# Patient Record
Sex: MALE | Race: ASIAN | NOT HISPANIC OR LATINO | URBAN - METROPOLITAN AREA
[De-identification: names, ages, dates, MRNs, and addresses within clinical notes are randomized per-mention and may not be internally consistent; named-entity substitution may affect disease eponyms.]

---

## 2021-11-28 ENCOUNTER — INPATIENT (INPATIENT)
Facility: HOSPITAL | Age: 66
LOS: 4 days | Discharge: HOME | End: 2021-12-03
Attending: HOSPITALIST | Admitting: HOSPITALIST
Payer: MEDICAID

## 2021-11-28 VITALS
SYSTOLIC BLOOD PRESSURE: 133 MMHG | DIASTOLIC BLOOD PRESSURE: 83 MMHG | HEART RATE: 100 BPM | OXYGEN SATURATION: 98 % | TEMPERATURE: 98 F | RESPIRATION RATE: 15 BRPM

## 2021-11-28 DIAGNOSIS — E11.9 TYPE 2 DIABETES MELLITUS WITHOUT COMPLICATIONS: ICD-10-CM

## 2021-11-28 DIAGNOSIS — Z29.9 ENCOUNTER FOR PROPHYLACTIC MEASURES, UNSPECIFIED: ICD-10-CM

## 2021-11-28 DIAGNOSIS — K82.8 OTHER SPECIFIED DISEASES OF GALLBLADDER: ICD-10-CM

## 2021-11-28 DIAGNOSIS — I10 ESSENTIAL (PRIMARY) HYPERTENSION: ICD-10-CM

## 2021-11-28 LAB
A1C WITH ESTIMATED AVERAGE GLUCOSE RESULT: 7 % — HIGH (ref 4–5.6)
ALBUMIN SERPL ELPH-MCNC: 3.7 G/DL — SIGNIFICANT CHANGE UP (ref 3.3–5)
ALP SERPL-CCNC: 60 U/L — SIGNIFICANT CHANGE UP (ref 40–120)
ALT FLD-CCNC: 39 U/L — SIGNIFICANT CHANGE UP (ref 4–41)
ANION GAP SERPL CALC-SCNC: 10 MMOL/L — SIGNIFICANT CHANGE UP (ref 7–14)
AST SERPL-CCNC: 20 U/L — SIGNIFICANT CHANGE UP (ref 4–40)
BASOPHILS # BLD AUTO: 0.04 K/UL — SIGNIFICANT CHANGE UP (ref 0–0.2)
BASOPHILS NFR BLD AUTO: 0.4 % — SIGNIFICANT CHANGE UP (ref 0–2)
BILIRUB SERPL-MCNC: 0.2 MG/DL — SIGNIFICANT CHANGE UP (ref 0.2–1.2)
BUN SERPL-MCNC: 15 MG/DL — SIGNIFICANT CHANGE UP (ref 7–23)
CALCIUM SERPL-MCNC: 9.3 MG/DL — SIGNIFICANT CHANGE UP (ref 8.4–10.5)
CHLORIDE SERPL-SCNC: 103 MMOL/L — SIGNIFICANT CHANGE UP (ref 98–107)
CO2 SERPL-SCNC: 24 MMOL/L — SIGNIFICANT CHANGE UP (ref 22–31)
CREAT SERPL-MCNC: 1.01 MG/DL — SIGNIFICANT CHANGE UP (ref 0.5–1.3)
EOSINOPHIL # BLD AUTO: 0.23 K/UL — SIGNIFICANT CHANGE UP (ref 0–0.5)
EOSINOPHIL NFR BLD AUTO: 2.5 % — SIGNIFICANT CHANGE UP (ref 0–6)
ESTIMATED AVERAGE GLUCOSE: 154 — SIGNIFICANT CHANGE UP
GLUCOSE BLDC GLUCOMTR-MCNC: 95 MG/DL — SIGNIFICANT CHANGE UP (ref 70–99)
GLUCOSE SERPL-MCNC: 108 MG/DL — HIGH (ref 70–99)
HCT VFR BLD CALC: 41.4 % — SIGNIFICANT CHANGE UP (ref 39–50)
HGB BLD-MCNC: 13.3 G/DL — SIGNIFICANT CHANGE UP (ref 13–17)
IANC: 5.07 K/UL — SIGNIFICANT CHANGE UP (ref 1.5–8.5)
IMM GRANULOCYTES NFR BLD AUTO: 0.3 % — SIGNIFICANT CHANGE UP (ref 0–1.5)
LIDOCAIN IGE QN: 43 U/L — SIGNIFICANT CHANGE UP (ref 7–60)
LYMPHOCYTES # BLD AUTO: 2.88 K/UL — SIGNIFICANT CHANGE UP (ref 1–3.3)
LYMPHOCYTES # BLD AUTO: 31.6 % — SIGNIFICANT CHANGE UP (ref 13–44)
MCHC RBC-ENTMCNC: 30.3 PG — SIGNIFICANT CHANGE UP (ref 27–34)
MCHC RBC-ENTMCNC: 32.1 GM/DL — SIGNIFICANT CHANGE UP (ref 32–36)
MCV RBC AUTO: 94.3 FL — SIGNIFICANT CHANGE UP (ref 80–100)
MONOCYTES # BLD AUTO: 0.87 K/UL — SIGNIFICANT CHANGE UP (ref 0–0.9)
MONOCYTES NFR BLD AUTO: 9.5 % — SIGNIFICANT CHANGE UP (ref 2–14)
NEUTROPHILS # BLD AUTO: 5.07 K/UL — SIGNIFICANT CHANGE UP (ref 1.8–7.4)
NEUTROPHILS NFR BLD AUTO: 55.7 % — SIGNIFICANT CHANGE UP (ref 43–77)
NRBC # BLD: 0 /100 WBCS — SIGNIFICANT CHANGE UP
NRBC # FLD: 0 K/UL — SIGNIFICANT CHANGE UP
PLATELET # BLD AUTO: 263 K/UL — SIGNIFICANT CHANGE UP (ref 150–400)
POTASSIUM SERPL-MCNC: 4.3 MMOL/L — SIGNIFICANT CHANGE UP (ref 3.5–5.3)
POTASSIUM SERPL-SCNC: 4.3 MMOL/L — SIGNIFICANT CHANGE UP (ref 3.5–5.3)
PROT SERPL-MCNC: 7.2 G/DL — SIGNIFICANT CHANGE UP (ref 6–8.3)
RBC # BLD: 4.39 M/UL — SIGNIFICANT CHANGE UP (ref 4.2–5.8)
RBC # FLD: 13.6 % — SIGNIFICANT CHANGE UP (ref 10.3–14.5)
SODIUM SERPL-SCNC: 137 MMOL/L — SIGNIFICANT CHANGE UP (ref 135–145)
WBC # BLD: 9.12 K/UL — SIGNIFICANT CHANGE UP (ref 3.8–10.5)
WBC # FLD AUTO: 9.12 K/UL — SIGNIFICANT CHANGE UP (ref 3.8–10.5)

## 2021-11-28 PROCEDURE — 76705 ECHO EXAM OF ABDOMEN: CPT | Mod: 26

## 2021-11-28 PROCEDURE — 74177 CT ABD & PELVIS W/CONTRAST: CPT | Mod: 26,ME

## 2021-11-28 PROCEDURE — 71250 CT THORAX DX C-: CPT | Mod: 26

## 2021-11-28 PROCEDURE — 99221 1ST HOSP IP/OBS SF/LOW 40: CPT

## 2021-11-28 PROCEDURE — 99285 EMERGENCY DEPT VISIT HI MDM: CPT

## 2021-11-28 PROCEDURE — G1004: CPT

## 2021-11-28 RX ORDER — FAMOTIDINE 10 MG/ML
20 INJECTION INTRAVENOUS ONCE
Refills: 0 | Status: COMPLETED | OUTPATIENT
Start: 2021-11-28 | End: 2021-11-28

## 2021-11-28 RX ORDER — METFORMIN HYDROCHLORIDE 850 MG/1
1 TABLET ORAL
Qty: 0 | Refills: 0 | DISCHARGE

## 2021-11-28 RX ORDER — FAMOTIDINE 10 MG/ML
1 INJECTION INTRAVENOUS
Qty: 0 | Refills: 0 | DISCHARGE

## 2021-11-28 RX ORDER — SODIUM CHLORIDE 9 MG/ML
1000 INJECTION, SOLUTION INTRAVENOUS
Refills: 0 | Status: DISCONTINUED | OUTPATIENT
Start: 2021-11-28 | End: 2021-12-03

## 2021-11-28 RX ORDER — AMLODIPINE BESYLATE 2.5 MG/1
5 TABLET ORAL DAILY
Refills: 0 | Status: DISCONTINUED | OUTPATIENT
Start: 2021-11-28 | End: 2021-11-28

## 2021-11-28 RX ORDER — AMLODIPINE BESYLATE 2.5 MG/1
1 TABLET ORAL
Qty: 0 | Refills: 0 | DISCHARGE

## 2021-11-28 RX ORDER — DEXTROSE 50 % IN WATER 50 %
25 SYRINGE (ML) INTRAVENOUS ONCE
Refills: 0 | Status: DISCONTINUED | OUTPATIENT
Start: 2021-11-28 | End: 2021-12-03

## 2021-11-28 RX ORDER — OXYCODONE HYDROCHLORIDE 5 MG/1
2.5 TABLET ORAL EVERY 6 HOURS
Refills: 0 | Status: DISCONTINUED | OUTPATIENT
Start: 2021-11-28 | End: 2021-12-03

## 2021-11-28 RX ORDER — INSULIN LISPRO 100/ML
VIAL (ML) SUBCUTANEOUS EVERY 6 HOURS
Refills: 0 | Status: DISCONTINUED | OUTPATIENT
Start: 2021-11-28 | End: 2021-11-29

## 2021-11-28 RX ORDER — DEXTROSE 50 % IN WATER 50 %
15 SYRINGE (ML) INTRAVENOUS ONCE
Refills: 0 | Status: DISCONTINUED | OUTPATIENT
Start: 2021-11-28 | End: 2021-12-03

## 2021-11-28 RX ORDER — ACETAMINOPHEN 500 MG
650 TABLET ORAL ONCE
Refills: 0 | Status: COMPLETED | OUTPATIENT
Start: 2021-11-28 | End: 2021-11-28

## 2021-11-28 RX ORDER — FAMOTIDINE 10 MG/ML
20 INJECTION INTRAVENOUS
Refills: 0 | Status: DISCONTINUED | OUTPATIENT
Start: 2021-11-28 | End: 2021-12-03

## 2021-11-28 RX ORDER — ENOXAPARIN SODIUM 100 MG/ML
40 INJECTION SUBCUTANEOUS DAILY
Refills: 0 | Status: DISCONTINUED | OUTPATIENT
Start: 2021-11-28 | End: 2021-11-28

## 2021-11-28 RX ORDER — OXYCODONE HYDROCHLORIDE 5 MG/1
5 TABLET ORAL EVERY 8 HOURS
Refills: 0 | Status: DISCONTINUED | OUTPATIENT
Start: 2021-11-28 | End: 2021-12-03

## 2021-11-28 RX ORDER — GLUCAGON INJECTION, SOLUTION 0.5 MG/.1ML
1 INJECTION, SOLUTION SUBCUTANEOUS ONCE
Refills: 0 | Status: DISCONTINUED | OUTPATIENT
Start: 2021-11-28 | End: 2021-12-03

## 2021-11-28 RX ORDER — ACETAMINOPHEN 500 MG
650 TABLET ORAL EVERY 6 HOURS
Refills: 0 | Status: DISCONTINUED | OUTPATIENT
Start: 2021-11-28 | End: 2021-12-03

## 2021-11-28 RX ORDER — AMLODIPINE BESYLATE 2.5 MG/1
5 TABLET ORAL DAILY
Refills: 0 | Status: DISCONTINUED | OUTPATIENT
Start: 2021-11-28 | End: 2021-12-03

## 2021-11-28 RX ORDER — DEXTROSE 50 % IN WATER 50 %
12.5 SYRINGE (ML) INTRAVENOUS ONCE
Refills: 0 | Status: DISCONTINUED | OUTPATIENT
Start: 2021-11-28 | End: 2021-12-03

## 2021-11-28 RX ADMIN — SODIUM CHLORIDE 50 MILLILITER(S): 9 INJECTION, SOLUTION INTRAVENOUS at 23:18

## 2021-11-28 RX ADMIN — Medication 650 MILLIGRAM(S): at 16:36

## 2021-11-28 RX ADMIN — Medication 30 MILLILITER(S): at 16:35

## 2021-11-28 RX ADMIN — AMLODIPINE BESYLATE 5 MILLIGRAM(S): 2.5 TABLET ORAL at 22:55

## 2021-11-28 RX ADMIN — FAMOTIDINE 20 MILLIGRAM(S): 10 INJECTION INTRAVENOUS at 16:40

## 2021-11-28 RX ADMIN — FAMOTIDINE 20 MILLIGRAM(S): 10 INJECTION INTRAVENOUS at 22:55

## 2021-11-28 NOTE — H&P ADULT - ATTENDING COMMENTS
65yo M with history of HTN and T2DM presents to hospital for evaluation of 5-6 month history of abdominal pain found to have gallbladder mass with hepatic invasion. No sign of cholangitis or acute on examinations, labs and imaging. Surgery recs appreciated.   -Possible MRCP vs. ERCP  -GI consulted by the resident   -pain control   -F/u with cancer makers   -NPO after midnight

## 2021-11-28 NOTE — H&P ADULT - HISTORY OF PRESENT ILLNESS
65yo M with history of HTN and T2DM presents to hospital for evaluation of 5-6 month history of abdominal pain. Patient reports insidious onset of epigastric abdominal pain 5-6 months ago. At that time was treated with H2 blockers with some relief. Pain has worsened since then, 9/10. Exacerbated with movement and eating, improved when lying still. Associated with early satiety and unknown amount of weight loss. He was seen in [facility] recently for similar complaint. At that time underwent US, CT and MRI which revealed gallbladder mass, prescribed flagyl and he was told to come to a 'better hospital'. He reports subjective fevers, abdominal distension and night sweats.    ED Course:  Afebrile. , /83. 98% on RA  Given tylenol and famotidine x1. Underwent US and CT scan revealing GB mass with extension into liver parenchyma.

## 2021-11-28 NOTE — ED PROVIDER NOTE - PHYSICAL EXAMINATION
Attending/Jessica: NAD; PERRL/EOMI, non-icterus, supple, no MACK, no JVD, RRR, CTAB; Abd-soft. slight abd distention, no HSM; no LE edema, A&Ox3, nonfocal; Skin-warm/dry

## 2021-11-28 NOTE — H&P ADULT - NSHPPHYSICALEXAM_GEN_ALL_CORE
VITALS:   T(C): 36.6 (11-28-21 @ 20:59), Max: 36.9 (11-28-21 @ 17:28)  HR: 84 (11-28-21 @ 20:59) (74 - 100)  BP: 120/92 (11-28-21 @ 20:59) (120/92 - 133/83)  RR: 16 (11-28-21 @ 20:59) (15 - 16)  SpO2: 95% (11-28-21 @ 20:59) (95% - 100%)    GENERAL: NAD, lying in bed comfortably  HEAD:  Atraumatic, Normocephalic  EYES: EOMI, PERRLA, conjunctiva and sclera clear  ENT: Moist mucous membranes  NECK: Supple, No JVD  CHEST/LUNG: Clear to auscultation bilaterally; No rales, rhonchi, wheezing, or rubs. Unlabored respirations  HEART: Regular rate and rhythm; No murmurs, rubs, or gallops  ABDOMEN: BSx4; soft, slight distension, NTTP  EXTREMITIES:  2+ Peripheral Pulses, brisk capillary refill. No clubbing, cyanosis, or edema  NERVOUS SYSTEM:  A&Ox3, no focal deficits   SKIN: No rashes or lesions

## 2021-11-28 NOTE — CONSULT NOTE ADULT - SUBJECTIVE AND OBJECTIVE BOX
SURGICAL ONCOLOGY CONSULT NOTE  --------------------------------------------------------------------------------------------    HPI: 66M w/ hx of HTN, DM, GERD, presenting with 5-6 months of upper abdominal pain that has worsened in the past 2 weeks. The pain is worse when lying down. Also reports diarrhea recently and early satiety. Reports losing about 5 lbs in the past 2-3 months. Had a normal BM this morning. Denies nausea/emesis/fevers/chills.    He went to [facility] last week and CT and MRCP were done showing gallbladder mass, and he was prescribed Flagyl. He was discharged and was recommended to see specialist.    Has never had surgery. Has never had a colonoscopy.    In the ED now he is hemodynamically normal. WBC and LFTs within normal limits. CT shows 3.5 cm gallbladder mass with possible liver and common hepatic duct invasion and abutting the duodenum.      PAST MEDICAL & SURGICAL HISTORY:  HTN  DM  GERD  No surgical history    FAMILY HISTORY:  Denies family history of cancer    SOCIAL HISTORY:  Denies alcohol or tobacco use    HOME MEDICATIONS:  Metformin 500mg BID  Famotidine 20mg daily  Amlodipine 5mg daily      --------------------------------------------------------------------------------------------    Vitals:   T(C): 36.6 (11-28-21 @ 20:59), Max: 36.9 (11-28-21 @ 17:28)  HR: 84 (11-28-21 @ 20:59) (74 - 100)  BP: 120/92 (11-28-21 @ 20:59) (120/92 - 133/83)  RR: 16 (11-28-21 @ 20:59) (15 - 16)  SpO2: 95% (11-28-21 @ 20:59) (95% - 100%)    PHYSICAL EXAM:  General: Alert, NAD  Neuro: A+Ox3  HEENT: NC/AT  Cardio: Pulse regular  Resp: Unlabored breathing  GI/Abd: Soft, mildly tender in upper abdomen, nondistended  Ext: Warm, no edema    --------------------------------------------------------------------------------------------    LABS  CBC (11-28 @ 16:37)                              13.3                           9.12    )----------------(  263        55.7  % Neutrophils, 31.6  % Lymphocytes, ANC: 5.07                                41.4      BMP (11-28 @ 16:37)             137     |  103     |  15    		Ca++ --      Ca 9.3                ---------------------------------( 108<H>		Mg --                 4.3     |  24      |  1.01  			Ph --        LFTs (11-28 @ 16:37)      TPro 7.2 / Alb 3.7 / TBili 0.2 / DBili -- / AST 20 / ALT 39 / AlkPhos 60    --------------------------------------------------------------------------------------------    IMAGING    CT Abdomen and Pelvis w/ IV Cont (11.28.21 @ 17:45)  FINDINGS:  LOWER CHEST: Within normal limits.    LIVER: Hyperemia in the within the liver surrounding the gallbladder.  BILE DUCTS: See below  GALLBLADDER: 3.5 x 3.0 cm low-attenuation mass replacing the gallbladder and invading the adjacent liver parenchyma. There is also involvement of the adjacent common hepatic duct resulting in mild intrahepatic dilatation. There is also very close abutment with the duodenum without definite fistulization..  SPLEEN: Within normal limits.  PANCREAS: Within normal limits.  ADRENALS: Within normal limits.  KIDNEYS/URETERS: Bilateral renal cysts. A 2.7 cm hypodensity in the left kidney that measures slightly higher than simple fluid.    BLADDER: Within normal limits.  REPRODUCTIVE ORGANS: Partially visualized right hydrocele. Prostate is normal.    BOWEL: No bowel obstruction. Appendix is normal. There is a 4 mm appendicolith at the tip of the appendix.  PERITONEUM: No ascites.  VESSELS: Within normal limits.  RETROPERITONEUM/LYMPH NODES: A 2.8 x 1.5 cm portal lymph node (2:31). Portacaval lymph node measures 2.6 x 0.9 cm (2:32).  ABDOMINAL WALL: Small fat-containing umbilical hernia.  BONES: Degenerative changes.    IMPRESSION:  3.5 x 3.0 cm low-attenuation mass replacing the gallbladder and invading the adjacent liver parenchyma. There is also involvement of the adjacent common hepatic duct resulting in mild intrahepatic dilatation. There is also very close abutment with the duodenum without definite fistulization.    Portal lymphadenopathy.    A 2.7 cm indeterminate hypodensity in the left kidney.

## 2021-11-28 NOTE — H&P ADULT - NSHPREVIEWOFSYSTEMS_GEN_ALL_CORE
REVIEW OF SYSTEMS:    CONSTITUTIONAL: + weakness, + low grade fevers  EYES/ENT: No visual changes;  No vertigo or throat pain   NECK: No pain or stiffness  RESPIRATORY: No cough, wheezing, hemoptysis; No shortness of breath  CARDIOVASCULAR: No chest pain or palpitations  GASTROINTESTINAL: + epigastric pain, + early satiety, + abdominal distension  GENITOURINARY: No dysuria, frequency or hematuria  NEUROLOGICAL: No numbness or weakness  SKIN: No itching, rashes REVIEW OF SYSTEMS:    CONSTITUTIONAL: + weakness, + low grade fevers  EYES/ENT: No visual changes;  No vertigo or throat pain   NECK: No pain or stiffness  RESPIRATORY: No cough, wheezing, hemoptysis; No shortness of breath  CARDIOVASCULAR: No chest pain or palpitations  GASTROINTESTINAL: + epigastric pain, + early satiety, + abdominal distension  GENITOURINARY: No dysuria, frequency or hematuria  NEUROLOGICAL: No numbness or weakness  MUSCULOSKELETAL: No LE edema, joint pains  SKIN: No itching, rashes  PSYCH: + anxiety surrounding diagnosis

## 2021-11-28 NOTE — H&P ADULT - ASSESSMENT
65yo M with history of HTN and T2DM presents to hospital for evaluation of 5-6 month history of abdominal pain found to have gallbladder mass with hepatic invasion and early satiety concerning for malignant process.

## 2021-11-28 NOTE — H&P ADULT - PROBLEM SELECTOR PLAN 4
DVT ppx: SCDs with planning for possible bx  Diet: Clears (Halal), NPO at MN  Dispo: Pending clinical course

## 2021-11-28 NOTE — H&P ADULT - PROBLEM SELECTOR PLAN 1
Found to have GB mass with hepatic invasion. Mild intrahepatic duct dilation. Currently no signs of obstructive jaundice. High concern for malignancy vs less likely chronic cholecystitis.  -Further evaluation with MRCP  -[facility] consult for EUS  -Check tumor markers in AM (, CEA, AFP)  -NPO at [facility] for possible bx  -Tylenol q6h PRN mild pain  -Oxy 2.5mg mod-severe pain, opiate naive Found to have GB mass with hepatic invasion. Mild intrahepatic duct dilation. Currently no signs of obstructive jaundice. High concern for malignancy vs less likely chronic cholecystitis.  -Further evaluation with MRCP  -[facility] consult for EUS  -Check tumor markers in AM (, CEA, AFP)  -NPO at [facility] for possible bx  -Tylenol q6h PRN mild pain  -Oxy 2.5mg/5mg mod/severe pain, opiate naive  -C/w pepcid BID  -C/w maalox PRN  -DVT ppx, mechanical for now in lieu of possible bx

## 2021-11-28 NOTE — ED PROVIDER NOTE - CLINICAL SUMMARY MEDICAL DECISION MAKING FREE TEXT BOX
A/P 67 yo M p/w acute on chronic abd pain, distention, pain r/o abd mass  -US, CT, EKG, labs, surgery consult

## 2021-11-28 NOTE — H&P ADULT - NSHPLABSRESULTS_GEN_ALL_CORE
13.3   9.12  )-----------( 263      ( 28 Nov 2021 16:37 )            41.4     11-28    137  |  103  |  15  ----------------------------<  108<H>  4.3   |  24  |  1.01    Ca    9.3      28 Nov 2021 16:37    TPro  7.2  /  Alb  3.7  /  TBili  0.2  /  DBili  x   /  AST  20  /  ALT  39  /  AlkPhos  60  11-28    < from: CT Abdomen and Pelvis w/ IV Cont (11.28.21 @ 17:45) >    LIVER: Hyperemia in the within the liver surrounding the gallbladder.  BILE DUCTS: See below  GALLBLADDER: 3.5 x 3.0 cm low-attenuation mass replacing the gallbladder and invading the adjacent liver parenchyma. There is also involvement of the adjacent common hepatic duct resulting in mild intrahepatic dilatation. There is also very close abutment with the duodenum without definite fistulization..  SPLEEN: Within normal limits.  PANCREAS: Within normal limits.  ADRENALS: Within normal limits.  KIDNEYS/URETERS: Bilateral renal cysts. A 2.7 cm hypodensity in the left kidney that measures slightly higher than simple fluid.    BLADDER: Within normal limits.  REPRODUCTIVE ORGANS: Partially visualized right hydrocele. Prostate is normal.    BOWEL: No bowel obstruction. Appendix is normal. There is a 4 mm appendicolith at the tip of the appendix.  PERITONEUM: No ascites.  VESSELS: Within normal limits.  RETROPERITONEUM/LYMPH NODES: A 2.8 x 1.5 cm portal lymph node (2:31). Portacaval lymph node measures 2.6 x 0.9 cm (2:32).  ABDOMINAL WALL: Small fat-containing umbilical hernia.  BONES: Degenerative changes.    IMPRESSION:  3.5 x 3.0 cm low-attenuation mass replacing the gallbladder and invading the adjacent liver parenchyma. There is also involvement of the adjacent common hepatic duct resulting in mild intrahepatic dilatation. There is also very close abutment with the duodenum without definite fistulization.    Portal lymphadenopathy.    A 2.7 cm indeterminate hypodensity in the left kidney.    < end of copied text >

## 2021-11-28 NOTE — ED ADULT NURSE REASSESSMENT NOTE - NS ED NURSE REASSESS COMMENT FT1
pt returned from CT. report given to ESSU 2 MICHAEL Novak. plan of care explained to pt using [place]  Minh #[id number]. FS and EKG completed. pt asking for food, will give clear liquid diet per order.

## 2021-11-28 NOTE — ED ADULT TRIAGE NOTE - CHIEF COMPLAINT QUOTE
Pt arrives c/o LUQ abd pain worse with movement associated with n/v/d. denies cp, sob, fevers, chills. PMHx DM, HTN, gallstones.

## 2021-11-28 NOTE — ED PROVIDER NOTE - OBJECTIVE STATEMENT
65 y/o M omhx dm, htn, reflux c/o upper abdominal pain x 5 months worse over the last 2 weeks. he states that he was seen at [facility] 1 week ago. had a workup and was told he has an issue with his gallbladder and to followup with a specialist. patient was given flagyl 500mg TID for 7 days but has not followed up. denies n/v but has periods of diarrhea . normal BM this morning. denies blood in stool. denies chest pain, dizziness. 65 y/o M omhx dm, htn, reflux c/o upper abdominal pain x 5 months worse over the last 2 weeks. he states that he was seen at [facility] 1 week ago. had a workup and was told he has an issue with his gallbladder and to followup with a specialist. patient was given flagyl 500mg TID for 7 days but has not followed up. denies n/v but has periods of diarrhea . normal BM this morning. denies blood in stool. denies chest pain, dizziness.    Attending/Jessica: 65 yo M Japanese speaking only, h/o DM, HTN p/w 5-6 months of abd pain, worse over the past two weeks. Pt reports having been evaluated at [facility] last week with work up including US, CT and MRI and allegedly told he has a gallbladder mass. Pt is now here of increase pain, abd distention and early satiety. Denies fever/chills, weakness or lightheadedness.   # [id number]

## 2021-11-28 NOTE — ED PROVIDER NOTE - PROGRESS NOTE DETAILS
BARON Scruggs: spoke with surgery team about concern on CT for new gallbladder mass. will come see patient. BARON Scruggs: spoke with surgery team. no surgical intervention at this time. Patient is known to Dr. Héctor TOBAR. patient to be admitted for ERCP and further workup.

## 2021-11-29 DIAGNOSIS — I10 ESSENTIAL (PRIMARY) HYPERTENSION: ICD-10-CM

## 2021-11-29 DIAGNOSIS — E11.9 TYPE 2 DIABETES MELLITUS WITHOUT COMPLICATIONS: ICD-10-CM

## 2021-11-29 LAB
AFP-TM SERPL-MCNC: 12.9 NG/ML — HIGH
ALBUMIN SERPL ELPH-MCNC: 3.8 G/DL — SIGNIFICANT CHANGE UP (ref 3.3–5)
ALP SERPL-CCNC: 61 U/L — SIGNIFICANT CHANGE UP (ref 40–120)
ALT FLD-CCNC: 38 U/L — SIGNIFICANT CHANGE UP (ref 4–41)
ANION GAP SERPL CALC-SCNC: 12 MMOL/L — SIGNIFICANT CHANGE UP (ref 7–14)
APTT BLD: 31.3 SEC — SIGNIFICANT CHANGE UP (ref 27–36.3)
AST SERPL-CCNC: 27 U/L — SIGNIFICANT CHANGE UP (ref 4–40)
BASOPHILS # BLD AUTO: 0.03 K/UL — SIGNIFICANT CHANGE UP (ref 0–0.2)
BASOPHILS NFR BLD AUTO: 0.4 % — SIGNIFICANT CHANGE UP (ref 0–2)
BILIRUB SERPL-MCNC: 0.4 MG/DL — SIGNIFICANT CHANGE UP (ref 0.2–1.2)
BUN SERPL-MCNC: 12 MG/DL — SIGNIFICANT CHANGE UP (ref 7–23)
CALCIUM SERPL-MCNC: 9.5 MG/DL — SIGNIFICANT CHANGE UP (ref 8.4–10.5)
CANCER AG19-9 SERPL-ACNC: 628 U/ML — HIGH
CEA SERPL-MCNC: 6 NG/ML — HIGH (ref 1–3.8)
CHLORIDE SERPL-SCNC: 101 MMOL/L — SIGNIFICANT CHANGE UP (ref 98–107)
CHOLEST SERPL-MCNC: 106 MG/DL — SIGNIFICANT CHANGE UP
CO2 SERPL-SCNC: 22 MMOL/L — SIGNIFICANT CHANGE UP (ref 22–31)
COVID-19 NUCLEOCAPSID GAM AB INTERP: POSITIVE
COVID-19 NUCLEOCAPSID TOTAL GAM ANTIBODY RESULT: 4.07 INDEX — HIGH
COVID-19 SPIKE DOMAIN AB INTERP: POSITIVE
COVID-19 SPIKE DOMAIN ANTIBODY RESULT: >250 U/ML — HIGH
CREAT SERPL-MCNC: 0.92 MG/DL — SIGNIFICANT CHANGE UP (ref 0.5–1.3)
EOSINOPHIL # BLD AUTO: 0.28 K/UL — SIGNIFICANT CHANGE UP (ref 0–0.5)
EOSINOPHIL NFR BLD AUTO: 3.3 % — SIGNIFICANT CHANGE UP (ref 0–6)
GLUCOSE BLDC GLUCOMTR-MCNC: 197 MG/DL — HIGH (ref 70–99)
GLUCOSE BLDC GLUCOMTR-MCNC: 87 MG/DL — SIGNIFICANT CHANGE UP (ref 70–99)
GLUCOSE BLDC GLUCOMTR-MCNC: 90 MG/DL — SIGNIFICANT CHANGE UP (ref 70–99)
GLUCOSE BLDC GLUCOMTR-MCNC: 91 MG/DL — SIGNIFICANT CHANGE UP (ref 70–99)
GLUCOSE BLDC GLUCOMTR-MCNC: 93 MG/DL — SIGNIFICANT CHANGE UP (ref 70–99)
GLUCOSE SERPL-MCNC: 97 MG/DL — SIGNIFICANT CHANGE UP (ref 70–99)
HCT VFR BLD CALC: 42.4 % — SIGNIFICANT CHANGE UP (ref 39–50)
HCV AB S/CO SERPL IA: 0.09 S/CO — SIGNIFICANT CHANGE UP (ref 0–0.99)
HCV AB SERPL-IMP: SIGNIFICANT CHANGE UP
HDLC SERPL-MCNC: 33 MG/DL — LOW
HGB BLD-MCNC: 14.6 G/DL — SIGNIFICANT CHANGE UP (ref 13–17)
IANC: 5.02 K/UL — SIGNIFICANT CHANGE UP (ref 1.5–8.5)
IMM GRANULOCYTES NFR BLD AUTO: 0.4 % — SIGNIFICANT CHANGE UP (ref 0–1.5)
INR BLD: 1.21 RATIO — HIGH (ref 0.88–1.16)
LIPID PNL WITH DIRECT LDL SERPL: 58 MG/DL — SIGNIFICANT CHANGE UP
LYMPHOCYTES # BLD AUTO: 2.32 K/UL — SIGNIFICANT CHANGE UP (ref 1–3.3)
LYMPHOCYTES # BLD AUTO: 27.2 % — SIGNIFICANT CHANGE UP (ref 13–44)
MAGNESIUM SERPL-MCNC: 2 MG/DL — SIGNIFICANT CHANGE UP (ref 1.6–2.6)
MCHC RBC-ENTMCNC: 31 PG — SIGNIFICANT CHANGE UP (ref 27–34)
MCHC RBC-ENTMCNC: 34.4 GM/DL — SIGNIFICANT CHANGE UP (ref 32–36)
MCV RBC AUTO: 90 FL — SIGNIFICANT CHANGE UP (ref 80–100)
MONOCYTES # BLD AUTO: 0.84 K/UL — SIGNIFICANT CHANGE UP (ref 0–0.9)
MONOCYTES NFR BLD AUTO: 9.9 % — SIGNIFICANT CHANGE UP (ref 2–14)
NEUTROPHILS # BLD AUTO: 5.02 K/UL — SIGNIFICANT CHANGE UP (ref 1.8–7.4)
NEUTROPHILS NFR BLD AUTO: 58.8 % — SIGNIFICANT CHANGE UP (ref 43–77)
NON HDL CHOLESTEROL: 73 MG/DL — SIGNIFICANT CHANGE UP
NRBC # BLD: 0 /100 WBCS — SIGNIFICANT CHANGE UP
NRBC # FLD: 0 K/UL — SIGNIFICANT CHANGE UP
PHOSPHATE SERPL-MCNC: 3.1 MG/DL — SIGNIFICANT CHANGE UP (ref 2.5–4.5)
PLATELET # BLD AUTO: 247 K/UL — SIGNIFICANT CHANGE UP (ref 150–400)
POTASSIUM SERPL-MCNC: 3.9 MMOL/L — SIGNIFICANT CHANGE UP (ref 3.5–5.3)
POTASSIUM SERPL-SCNC: 3.9 MMOL/L — SIGNIFICANT CHANGE UP (ref 3.5–5.3)
PROT SERPL-MCNC: 7.3 G/DL — SIGNIFICANT CHANGE UP (ref 6–8.3)
PROTHROM AB SERPL-ACNC: 13.8 SEC — HIGH (ref 10.6–13.6)
RBC # BLD: 4.71 M/UL — SIGNIFICANT CHANGE UP (ref 4.2–5.8)
RBC # FLD: 13.5 % — SIGNIFICANT CHANGE UP (ref 10.3–14.5)
SARS-COV-2 IGG+IGM SERPL QL IA: 4.07 INDEX — HIGH
SARS-COV-2 IGG+IGM SERPL QL IA: >250 U/ML — HIGH
SARS-COV-2 IGG+IGM SERPL QL IA: POSITIVE
SARS-COV-2 IGG+IGM SERPL QL IA: POSITIVE
SARS-COV-2 RNA SPEC QL NAA+PROBE: SIGNIFICANT CHANGE UP
SODIUM SERPL-SCNC: 135 MMOL/L — SIGNIFICANT CHANGE UP (ref 135–145)
TRIGL SERPL-MCNC: 76 MG/DL — SIGNIFICANT CHANGE UP
WBC # BLD: 8.52 K/UL — SIGNIFICANT CHANGE UP (ref 3.8–10.5)
WBC # FLD AUTO: 8.52 K/UL — SIGNIFICANT CHANGE UP (ref 3.8–10.5)

## 2021-11-29 PROCEDURE — 12345: CPT | Mod: NC,GC

## 2021-11-29 PROCEDURE — 99223 1ST HOSP IP/OBS HIGH 75: CPT

## 2021-11-29 RX ORDER — INSULIN LISPRO 100/ML
VIAL (ML) SUBCUTANEOUS
Refills: 0 | Status: DISCONTINUED | OUTPATIENT
Start: 2021-11-29 | End: 2021-12-03

## 2021-11-29 RX ADMIN — FAMOTIDINE 20 MILLIGRAM(S): 10 INJECTION INTRAVENOUS at 18:03

## 2021-11-29 RX ADMIN — AMLODIPINE BESYLATE 5 MILLIGRAM(S): 2.5 TABLET ORAL at 05:00

## 2021-11-29 RX ADMIN — SODIUM CHLORIDE 50 MILLILITER(S): 9 INJECTION, SOLUTION INTRAVENOUS at 00:13

## 2021-11-29 RX ADMIN — FAMOTIDINE 20 MILLIGRAM(S): 10 INJECTION INTRAVENOUS at 05:00

## 2021-11-29 NOTE — PROGRESS NOTE ADULT - SUBJECTIVE AND OBJECTIVE BOX
Maribeth Rojas MD  Internal Medicine, PGY1  Universal pager: [phone] / [facility]: [id number]      Patient is a 66y old  Male who presents with a chief complaint of Abdominal pain (28 Nov 2021 21:11)    OVERNIGHT EVENTS: NAEON    SUBJECTIVE:  - denies F/C, lightheadedness, HA, CP, SOB, abd pain, N/V/D/C, burning w/ urination, leg swelling    focused ROS as above    MEDICATIONS  (STANDING):  amLODIPine   Tablet 5 milliGRAM(s) Oral daily  dextrose 40% Gel 15 Gram(s) Oral once  dextrose 5%. 1000 milliLiter(s) (50 mL/Hr) IV Continuous <Continuous>  dextrose 5%. 1000 milliLiter(s) (100 mL/Hr) IV Continuous <Continuous>  dextrose 50% Injectable 25 Gram(s) IV Push once  dextrose 50% Injectable 12.5 Gram(s) IV Push once  dextrose 50% Injectable 25 Gram(s) IV Push once  famotidine    Tablet 20 milliGRAM(s) Oral two times a day  glucagon  Injectable 1 milliGRAM(s) IntraMuscular once  insulin lispro (ADMELOG) corrective regimen sliding scale   SubCutaneous every 6 hours  lactated ringers. 1000 milliLiter(s) (50 mL/Hr) IV Continuous <Continuous>    MEDICATIONS  (PRN):  acetaminophen     Tablet .. 650 milliGRAM(s) Oral every 6 hours PRN Mild Pain (1 - 3)  aluminum hydroxide/magnesium hydroxide/simethicone Suspension 30 milliLiter(s) Oral every 4 hours PRN Dyspepsia  oxyCODONE    IR 2.5 milliGRAM(s) Oral every 6 hours PRN Moderate Pain (4 - 6)  oxyCODONE    IR 5 milliGRAM(s) Oral every 8 hours PRN Severe Pain (7 - 10)      OBJECTIVE:  T(C): 36.6 (11-29-21 @ 04:55), Max: 36.9 (11-28-21 @ 17:28)  HR: 89 (11-29-21 @ 04:55) (74 - 100)  BP: 137/87 (11-29-21 @ 04:55) (120/92 - 137/87)  RR: 16 (11-29-21 @ 04:55) (15 - 16)  SpO2: 99% (11-29-21 @ 04:55) (95% - 100%)    PHYSICAL EXAM  GENERAL: NAD   HEAD:  Atraumatic, normocephalic  EYES: EOMI, sclera clear  CHEST/LUNG: Clear to auscultation bilaterally; no wheeze  HEART: RRR; S1, S2; no M/R/G  ABDOMEN: soft, non-distended; non-tender; BS present  EXTREMITIES:  2+ Peripheral Pulses; no edema  NEURO: non-focal, AAOx  PSYCH: appropriate affect  SKIN: No rashes or lesions    LABS:  CAPILLARY BLOOD GLUCOSE      POCT Blood Glucose.: 90 mg/dL (29 Nov 2021 04:23)  POCT Blood Glucose.: 95 mg/dL (28 Nov 2021 22:25)  POCT Blood Glucose.: 126 mg/dL (28 Nov 2021 14:00)    I&O's Summary    28 Nov 2021 07:01  -  29 Nov 2021 07:00  --------------------------------------------------------  IN: 0 mL / OUT: 400 mL / NET: -400 mL                            14.6   8.52  )-----------( 247      ( 29 Nov 2021 06:56 )             42.4     11-28    137  |  103  |  15  ----------------------------<  108<H>  4.3   |  24  |  1.01    Ca    9.3      28 Nov 2021 16:37    TPro  7.2  /  Alb  3.7  /  TBili  0.2  /  DBili  x   /  AST  20  /  ALT  39  /  AlkPhos  60  11-28    PT/INR - ( 29 Nov 2021 06:56 )   PT: 13.8 sec;   INR: 1.21 ratio         PTT - ( 29 Nov 2021 06:56 )  PTT:31.3 sec          Microbiology:      RADIOLOGY & ADDITIONAL TESTS:    Imaging Personally Reviewed:  Consultant(s) Notes Reviewed:    Care Discussed with Consultants/Other Providers: Maribeth Rojas MD  Internal Medicine, PGY1  Universal pager: [phone] / [facility]: [phone]      Patient is a 66y old  Male who presents with a chief complaint of Abdominal pain (28 Nov 2021 21:11)      SUBJECTIVE: Pt admitted overnight. Denies F/C, lightheadedness, HA, CP, SOB, N/V/C/D, burning w/ urination, leg swelling. Pt wants to get biopsy    focused ROS as above    MEDICATIONS  (STANDING):  amLODIPine   Tablet 5 milliGRAM(s) Oral daily  dextrose 40% Gel 15 Gram(s) Oral once  dextrose 5%. 1000 milliLiter(s) (50 mL/Hr) IV Continuous <Continuous>  dextrose 5%. 1000 milliLiter(s) (100 mL/Hr) IV Continuous <Continuous>  dextrose 50% Injectable 25 Gram(s) IV Push once  dextrose 50% Injectable 12.5 Gram(s) IV Push once  dextrose 50% Injectable 25 Gram(s) IV Push once  famotidine    Tablet 20 milliGRAM(s) Oral two times a day  glucagon  Injectable 1 milliGRAM(s) IntraMuscular once  insulin lispro (ADMELOG) corrective regimen sliding scale   SubCutaneous every 6 hours  lactated ringers. 1000 milliLiter(s) (50 mL/Hr) IV Continuous <Continuous>    MEDICATIONS  (PRN):  acetaminophen     Tablet .. 650 milliGRAM(s) Oral every 6 hours PRN Mild Pain (1 - 3)  aluminum hydroxide/magnesium hydroxide/simethicone Suspension 30 milliLiter(s) Oral every 4 hours PRN Dyspepsia  oxyCODONE    IR 2.5 milliGRAM(s) Oral every 6 hours PRN Moderate Pain (4 - 6)  oxyCODONE    IR 5 milliGRAM(s) Oral every 8 hours PRN Severe Pain (7 - 10)      OBJECTIVE:  T(C): 36.6 (11-29-21 @ 04:55), Max: 36.9 (11-28-21 @ 17:28)  HR: 89 (11-29-21 @ 04:55) (74 - 100)  BP: 137/87 (11-29-21 @ 04:55) (120/92 - 137/87)  RR: 16 (11-29-21 @ 04:55) (15 - 16)  SpO2: 99% (11-29-21 @ 04:55) (95% - 100%)    PHYSICAL EXAM  GENERAL: NAD   HEAD:  Atraumatic, normocephalic  EYES: EOMI, sclera clear  CHEST/LUNG: Clear to auscultation bilaterally; no wheeze  HEART: RRR; S1, S2; no M/R/G  ABDOMEN: mildly distended abdomen with tenderness to palpation around the umbilicus and epigastric area; no masses. BS present  EXTREMITIES:  2+ Peripheral Pulses; no edema  NEURO: non-focal, AAOx3  PSYCH: appropriate affect  SKIN: No rashes or lesions    LABS:  CAPILLARY BLOOD GLUCOSE      POCT Blood Glucose.: 90 mg/dL (29 Nov 2021 04:23)  POCT Blood Glucose.: 95 mg/dL (28 Nov 2021 22:25)  POCT Blood Glucose.: 126 mg/dL (28 Nov 2021 14:00)    I&O's Summary    28 Nov 2021 07:01  -  29 Nov 2021 07:00  --------------------------------------------------------  IN: 0 mL / OUT: 400 mL / NET: -400 mL                            14.6   8.52  )-----------( 247      ( 29 Nov 2021 06:56 )             42.4     11-28    137  |  103  |  15  ----------------------------<  108<H>  4.3   |  24  |  1.01    Ca    9.3      28 Nov 2021 16:37    TPro  7.2  /  Alb  3.7  /  TBili  0.2  /  DBili  x   /  AST  20  /  ALT  39  /  AlkPhos  60  11-28    PT/INR - ( 29 Nov 2021 06:56 )   PT: 13.8 sec;   INR: 1.21 ratio         PTT - ( 29 Nov 2021 06:56 )  PTT:31.3 sec          Microbiology:      RADIOLOGY & ADDITIONAL TESTS:    Imaging Personally Reviewed:  Consultant(s) Notes Reviewed:    Care Discussed with Consultants/Other Providers:

## 2021-11-29 NOTE — PROGRESS NOTE ADULT - ASSESSMENT
67yo M with history of HTN and T2DM presents to hospital for evaluation of 5-6 month history of abdominal pain found to have gallbladder mass with hepatic invasion and early satiety concerning for malignant process.

## 2021-11-29 NOTE — PROGRESS NOTE ADULT - PROBLEM SELECTOR PLAN 1
Found to have GB mass with hepatic invasion. Mild intrahepatic duct dilation. Currently no signs of obstructive jaundice. High concern for malignancy vs less likely chronic cholecystitis.  -Further evaluation with MRCP  -[facility] consult for EUS  -Check tumor markers in AM (, CEA, AFP)  -NPO at MN for possible bx  -Tylenol q6h PRN mild pain  -Oxy 2.5mg/5mg mod/severe pain, opiate naive  -C/w pepcid BID  -C/w maalox PRN  -DVT ppx, mechanical for now in lieu of possible bx Found to have GB mass with hepatic invasion. Mild intrahepatic duct dilation. Currently no signs of obstructive jaundice. High concern for malignancy vs less likely chronic cholecystitis.  - Chest CT w/o evidence of disease in thorax  -, CEA, AFP elevated  -Tylenol q6h PRN mild pain  -Oxy 2.5mg/5mg mod/severe pain, opiate naive  -C/w pepcid BID  -C/w maalox PRN  -DVT ppx, mechanical for now in lieu of possible bx  - ordered MRI abdomen, MRCP for further evaluation of mass  - EUS FNA biopsy Wednesday with advanced GI

## 2021-11-29 NOTE — CONSULT NOTE ADULT - ASSESSMENT
Impression:  # 3.5 x 3.0 cm low-attenuation mass replacing the gallbladder and invading the adjacent liver parenchyma causing mild intrahepatic dilatation. There is also very close abutment with the duodenum without definite fistulization.  # 2.8 x 1.5 cm portal lymph node and Portacaval lymph node measures 2.6 x 0.9 cm .    Recommendations:  - Will plan for EUS  + FNB on Wednesday   - Recommend MRI abdomen with and without contrast for better evaluation of anatomy and involved structures    Artur Acevedo MD  Gastroenterology Fellow  Pager: [phone]  Please call answering service [phone] / on-call GI fellow after 5pm and before 8am, and on weekends.

## 2021-11-29 NOTE — PROGRESS NOTE ADULT - PROBLEM SELECTOR PLAN 4
DVT ppx: SCDs with planning for possible bx  Diet: Clears (Halal), NPO at [facility]  Dispo: Pending clinical course DVT ppx: SCDs with planning for possible bx  Diet:  Halal  Dispo: Pending clinical course

## 2021-11-29 NOTE — PROGRESS NOTE ADULT - PROBLEM SELECTOR PLAN 3
Hx of T2DM on metformin 500mg BID.  -Check A1c  -Check lipid panel  -ISS, FSq6 while NPO Hx of T2DM on metformin 500mg BID.  -A1c 7  - Lipid panel with low HDL at 33 with total cholesterol WNL  -ISS, FSq6 while NPO

## 2021-11-29 NOTE — PROGRESS NOTE ADULT - SUBJECTIVE AND OBJECTIVE BOX
24h Events:   - Overnight, no acute events    Subjective:   Patient examined at bedside this AM. Reports some abdominal pain.  Denies N/V.  States he is passing gas and having bowel movements.     Objective:  Vital Signs  T(C): 36.9 (11-29 @ 10:41), Max: 36.9 (11-28 @ 17:28)  HR: 87 (11-29 @ 10:41) (74 - 89)  BP: 121/82 (11-29 @ 10:41) (120/92 - 137/87)  RR: 18 (11-29 @ 10:41) (16 - 18)  SpO2: 97% (11-29 @ 10:41) (95% - 100%)  11-28-21 @ 07:01  -  11-29-21 @ 07:00  --------------------------------------------------------  IN:  Total IN: 0 mL    OUT:    Voided (mL): 400 mL  Total OUT: 400 mL    Total NET: -400 mL          Physical Exam:  GEN: resting in bed comfortably in NAD  RESP: no increased WOB  ABD: soft, non-distended, slightly tender in epigastrium and RUQ without rebound tenderness or guarding  NEURO: AAOx4    Labs:                        14.6   8.52  )-----------( 247      ( 29 Nov 2021 06:56 )             42.4   11-29    135  |  101  |  12  ----------------------------<  97  3.9   |  22  |  0.92    Ca    9.5      29 Nov 2021 06:56  Phos  3.1     11-29  Mg     2.00     11-29    TPro  7.3  /  Alb  3.8  /  TBili  0.4  /  DBili  x   /  AST  27  /  ALT  38  /  AlkPhos  61  11-29    CAPILLARY BLOOD GLUCOSE      POCT Blood Glucose.: 87 mg/dL (29 Nov 2021 12:49)  POCT Blood Glucose.: 91 mg/dL (29 Nov 2021 08:49)  POCT Blood Glucose.: 90 mg/dL (29 Nov 2021 04:23)  POCT Blood Glucose.: 95 mg/dL (28 Nov 2021 22:25)      Medications:   MEDICATIONS  (STANDING):  amLODIPine   Tablet 5 milliGRAM(s) Oral daily  dextrose 40% Gel 15 Gram(s) Oral once  dextrose 5%. 1000 milliLiter(s) (50 mL/Hr) IV Continuous <Continuous>  dextrose 5%. 1000 milliLiter(s) (100 mL/Hr) IV Continuous <Continuous>  dextrose 50% Injectable 25 Gram(s) IV Push once  dextrose 50% Injectable 12.5 Gram(s) IV Push once  dextrose 50% Injectable 25 Gram(s) IV Push once  famotidine    Tablet 20 milliGRAM(s) Oral two times a day  glucagon  Injectable 1 milliGRAM(s) IntraMuscular once  insulin lispro (ADMELOG) corrective regimen sliding scale   SubCutaneous every 6 hours  lactated ringers. 1000 milliLiter(s) (50 mL/Hr) IV Continuous <Continuous>    MEDICATIONS  (PRN):  acetaminophen     Tablet .. 650 milliGRAM(s) Oral every 6 hours PRN Mild Pain (1 - 3)  aluminum hydroxide/magnesium hydroxide/simethicone Suspension 30 milliLiter(s) Oral every 4 hours PRN Dyspepsia  oxyCODONE    IR 2.5 milliGRAM(s) Oral every 6 hours PRN Moderate Pain (4 - 6)  oxyCODONE    IR 5 milliGRAM(s) Oral every 8 hours PRN Severe Pain (7 - 10)      Assessment:   66M w/ hx of HTN, DM, GERD, presenting with abdominal pain for 5-6 months and imaging with 3.5cm gallbladder mass.    - MRCP to better evaluate anatomy and involved structures  - CT chest noted, will follow up official read   - Please send tumor markers (CEA, CA 19-9)  - GI consult for ERCP/EUS/biopsy  - Discussed with attending, Dr. Davies  - Will follow    Surgical Oncology / D team surgery  Pager [phone]

## 2021-11-29 NOTE — PATIENT PROFILE ADULT - PATIENT REPRESENTATIVE: ( YOU CAN CHOOSE ANY PERSON THAT CAN ASSIST YOU WITH YOUR HEALTH CARE PREFERENCES, DOES NOT HAVE TO BE A SPOUSE, IMMEDIATE FAMILY OR SIGNIFICANT OTHER/PARTNER)
If you are a smoker, it is important for your health to stop smoking. Please be aware that second hand smoke is also harmful.
declines

## 2021-11-29 NOTE — CONSULT NOTE ADULT - SUBJECTIVE AND OBJECTIVE BOX
HPI:  65yo M with history of HTN and T2DM presents to hospital for evaluation of 5-6 month history of abdominal pain. Patient reports insidious onset of epigastric abdominal pain 5-6 months ago. At that time was treated with H2 blockers with some relief. Pain has worsened since then, 9/10. Exacerbated with movement and eating, improved when lying still. Associated with early satiety and unknown amount of weight loss. He was seen in [facility] recently for similar complaint. At that time underwent US, CT and MRI which revealed gallbladder mass, prescribed flagyl and he was told to come to a 'better hospital'. He reports subjective fevers, abdominal distension and night sweats.    ED Course:  Afebrile. , /83. 98% on RA  Given tylenol and famotidine x1. Underwent US and CT scan revealing GB mass with extension into liver parenchyma.         Allergies:  No Known Allergies    Home Medications:  amLODIPine 5 mg oral tablet: 1 tab(s) orally once a day (28 Nov 2021 21:53)  famotidine 20 mg oral tablet: 1 tab(s) orally 2 times a day (28 Nov 2021 21:53)  metFORMIN 500 mg oral tablet: 1 tab(s) orally 2 times a day (28 Nov 2021 21:53)      Hospital Medications:  acetaminophen     Tablet .. 650 milliGRAM(s) Oral every 6 hours PRN  aluminum hydroxide/magnesium hydroxide/simethicone Suspension 30 milliLiter(s) Oral every 4 hours PRN  amLODIPine   Tablet 5 milliGRAM(s) Oral daily  dextrose 40% Gel 15 Gram(s) Oral once  dextrose 5%. 1000 milliLiter(s) IV Continuous <Continuous>  dextrose 5%. 1000 milliLiter(s) IV Continuous <Continuous>  dextrose 50% Injectable 25 Gram(s) IV Push once  dextrose 50% Injectable 12.5 Gram(s) IV Push once  dextrose 50% Injectable 25 Gram(s) IV Push once  famotidine    Tablet 20 milliGRAM(s) Oral two times a day  glucagon  Injectable 1 milliGRAM(s) IntraMuscular once  insulin lispro (ADMELOG) corrective regimen sliding scale   SubCutaneous every 6 hours  lactated ringers. 1000 milliLiter(s) IV Continuous <Continuous>  oxyCODONE    IR 2.5 milliGRAM(s) Oral every 6 hours PRN  oxyCODONE    IR 5 milliGRAM(s) Oral every 8 hours PRN      PMHX/PSHX:  HTN (hypertension)    DM (diabetes mellitus)    GERD (gastroesophageal reflux disease)    No significant past surgical history        Family history:  No pertinent family history in first degree relatives        Social History: no smoking    ROS:   General:  No fevers, chills or night sweats  ENT:  No sore throat or dysphagia  CV:  No pain or palpitations  Resp:  No dyspnea, cough or  wheezing  GI:  as above  Skin:  No rash or edema  Neuro: no weakness   Hematologic: no bleeding  Musculoskeletal: no muscle pain or join pain  Psych: no agitation     : no dysuria      PHYSICAL EXAM:   GENERAL:  NAD, Appears stated age  HEENT:  NC/AT,  conjunctivae clear and pink, sclera -anicteric  CHEST:  CTA B/L, Normal effort  HEART:  RRR S1/S2,  ABDOMEN:  Soft, non-tender, non-distended,  no masses   EXTREMITIES:  No cyanosis or Edema  SKIN:  Warm & Dry. No rash or erythema  NEURO:  Alert, oriented, no focal deficit    Vital Signs:  Vital Signs Last 24 Hrs  T(C): 36.9 (29 Nov 2021 10:41), Max: 36.9 (28 Nov 2021 17:28)  T(F): 98.4 (29 Nov 2021 10:41), Max: 98.4 (28 Nov 2021 17:28)  HR: 87 (29 Nov 2021 10:41) (74 - 100)  BP: 121/82 (29 Nov 2021 10:41) (120/92 - 137/87)  BP(mean): --  RR: 18 (29 Nov 2021 10:41) (15 - 18)  SpO2: 97% (29 Nov 2021 10:41) (95% - 100%)  Daily     Daily     LABS:                        14.6   8.52  )-----------( 247      ( 29 Nov 2021 06:56 )             42.4     Mean Cell Volume: 90.0 fL (11-29-21 @ 06:56)    11-29    135  |  101  |  12  ----------------------------<  97  3.9   |  22  |  0.92    Ca    9.5      29 Nov 2021 06:56  Phos  3.1     11-29  Mg     2.00     11-29    TPro  7.3  /  Alb  3.8  /  TBili  0.4  /  DBili  x   /  AST  27  /  ALT  38  /  AlkPhos  61  11-29    LIVER FUNCTIONS - ( 29 Nov 2021 06:56 )  Alb: 3.8 g/dL / Pro: 7.3 g/dL / ALK PHOS: 61 U/L / ALT: 38 U/L / AST: 27 U/L / GGT: x           PT/INR - ( 29 Nov 2021 06:56 )   PT: 13.8 sec;   INR: 1.21 ratio         PTT - ( 29 Nov 2021 06:56 )  PTT:31.3 sec    Amylase Serum--      Lipase serum43       Ammonia--                          14.6   8.52  )-----------( 247      ( 29 Nov 2021 06:56 )             42.4                         13.3   9.12  )-----------( 263      ( 28 Nov 2021 16:37 )             41.4     Imaging:    < from: CT Abdomen and Pelvis w/ IV Cont (11.28.21 @ 17:45) >    EXAM:  CT ABDOMEN AND PELVIS IC        PROCEDURE DATE:  Nov 28 2021         INTERPRETATION:  CLINICAL INFORMATION: Abdominal pain. Possible gallbladder mass on ultrasound.    COMPARISON: Correlation is made to abdominal ultrasound 11/28/2021.    CONTRAST/COMPLICATIONS:  IV Contrast: Omnipaque 350  90 cc administered   10 cc discarded  Oral Contrast: NONE  Complications: None reported at time of study completion    PROCEDURE:  CT of the Abdomen and Pelvis was performed.  Sagittal and coronal reformats were performed.    FINDINGS:  LOWER CHEST: Within normal limits.    LIVER: Hyperemia in the within the liver surrounding the gallbladder.  BILE DUCTS: See below  GALLBLADDER: 3.5 x 3.0 cm low-attenuation mass replacing the gallbladder and invading the adjacent liver parenchyma. There is also involvement of the adjacent common hepatic duct resulting in mild intrahepatic dilatation. There is also very close abutment with the duodenum without definite fistulization..  SPLEEN: Within normal limits.  PANCREAS: Within normal limits.  ADRENALS: Within normal limits.  KIDNEYS/URETERS: Bilateral renal cysts. A 2.7 cm hypodensity in the left kidney that measures slightly higher than simple fluid.    BLADDER: Within normal limits.  REPRODUCTIVE ORGANS: Partially visualized right hydrocele. Prostate is normal.    BOWEL: No bowel obstruction. Appendix is normal. There is a 4 mm appendicolith at the tip of the appendix.  PERITONEUM: No ascites.  VESSELS: Within normal limits.  RETROPERITONEUM/LYMPH NODES: A 2.8 x 1.5 cm portal lymph node (2:31). Portacaval lymph node measures 2.6 x 0.9 cm (2:32).  ABDOMINAL WALL: Small fat-containing umbilical hernia.  BONES: Degenerative changes.    IMPRESSION:  3.5 x 3.0 cm low-attenuation mass replacing the gallbladder and invading the adjacent liver parenchyma. There is also involvement of the adjacent common hepatic duct resulting in mild intrahepatic dilatation. There is also very close abutment with the duodenum without definite fistulization.    Portal lymphadenopathy.    A 2.7 cm indeterminate hypodensity in the left kidney.      --- End of Report ---    < end of copied text >

## 2021-11-29 NOTE — PATIENT PROFILE ADULT - NSPROHMDIABETMGMTSTRAT_GEN_A_NUR
adequate rest/coping strategies/diet modification/exercise/medication therapy/routine screenings/weight management

## 2021-11-30 LAB
ALBUMIN SERPL ELPH-MCNC: 4.2 G/DL — SIGNIFICANT CHANGE UP (ref 3.3–5)
ALP SERPL-CCNC: 68 U/L — SIGNIFICANT CHANGE UP (ref 40–120)
ALT FLD-CCNC: 34 U/L — SIGNIFICANT CHANGE UP (ref 4–41)
ANION GAP SERPL CALC-SCNC: 13 MMOL/L — SIGNIFICANT CHANGE UP (ref 7–14)
AST SERPL-CCNC: 26 U/L — SIGNIFICANT CHANGE UP (ref 4–40)
BILIRUB SERPL-MCNC: 0.5 MG/DL — SIGNIFICANT CHANGE UP (ref 0.2–1.2)
BUN SERPL-MCNC: 15 MG/DL — SIGNIFICANT CHANGE UP (ref 7–23)
CALCIUM SERPL-MCNC: 9.5 MG/DL — SIGNIFICANT CHANGE UP (ref 8.4–10.5)
CHLORIDE SERPL-SCNC: 100 MMOL/L — SIGNIFICANT CHANGE UP (ref 98–107)
CO2 SERPL-SCNC: 24 MMOL/L — SIGNIFICANT CHANGE UP (ref 22–31)
CREAT SERPL-MCNC: 1 MG/DL — SIGNIFICANT CHANGE UP (ref 0.5–1.3)
GAMMA INTERFERON BACKGROUND BLD IA-ACNC: 0.06 IU/ML — SIGNIFICANT CHANGE UP
GLUCOSE BLDC GLUCOMTR-MCNC: 102 MG/DL — HIGH (ref 70–99)
GLUCOSE BLDC GLUCOMTR-MCNC: 150 MG/DL — HIGH (ref 70–99)
GLUCOSE BLDC GLUCOMTR-MCNC: 181 MG/DL — HIGH (ref 70–99)
GLUCOSE BLDC GLUCOMTR-MCNC: 195 MG/DL — HIGH (ref 70–99)
GLUCOSE BLDC GLUCOMTR-MCNC: 223 MG/DL — HIGH (ref 70–99)
GLUCOSE SERPL-MCNC: 110 MG/DL — HIGH (ref 70–99)
HCT VFR BLD CALC: 46.3 % — SIGNIFICANT CHANGE UP (ref 39–50)
HGB BLD-MCNC: 15.5 G/DL — SIGNIFICANT CHANGE UP (ref 13–17)
M TB IFN-G BLD-IMP: NEGATIVE — SIGNIFICANT CHANGE UP
M TB IFN-G CD4+ BCKGRND COR BLD-ACNC: 0.27 IU/ML — SIGNIFICANT CHANGE UP
M TB IFN-G CD4+CD8+ BCKGRND COR BLD-ACNC: 0.26 IU/ML — SIGNIFICANT CHANGE UP
MAGNESIUM SERPL-MCNC: 2.4 MG/DL — SIGNIFICANT CHANGE UP (ref 1.6–2.6)
MCHC RBC-ENTMCNC: 30.1 PG — SIGNIFICANT CHANGE UP (ref 27–34)
MCHC RBC-ENTMCNC: 33.5 GM/DL — SIGNIFICANT CHANGE UP (ref 32–36)
MCV RBC AUTO: 89.9 FL — SIGNIFICANT CHANGE UP (ref 80–100)
NRBC # BLD: 0 /100 WBCS — SIGNIFICANT CHANGE UP
NRBC # FLD: 0 K/UL — SIGNIFICANT CHANGE UP
PHOSPHATE SERPL-MCNC: 3.3 MG/DL — SIGNIFICANT CHANGE UP (ref 2.5–4.5)
PLATELET # BLD AUTO: 281 K/UL — SIGNIFICANT CHANGE UP (ref 150–400)
POTASSIUM SERPL-MCNC: 3.9 MMOL/L — SIGNIFICANT CHANGE UP (ref 3.5–5.3)
POTASSIUM SERPL-SCNC: 3.9 MMOL/L — SIGNIFICANT CHANGE UP (ref 3.5–5.3)
PROT SERPL-MCNC: 8.1 G/DL — SIGNIFICANT CHANGE UP (ref 6–8.3)
QUANT TB PLUS MITOGEN MINUS NIL: 6.99 IU/ML — SIGNIFICANT CHANGE UP
RBC # BLD: 5.15 M/UL — SIGNIFICANT CHANGE UP (ref 4.2–5.8)
RBC # FLD: 13.7 % — SIGNIFICANT CHANGE UP (ref 10.3–14.5)
SARS-COV-2 RNA SPEC QL NAA+PROBE: SIGNIFICANT CHANGE UP
SODIUM SERPL-SCNC: 137 MMOL/L — SIGNIFICANT CHANGE UP (ref 135–145)
WBC # BLD: 8.92 K/UL — SIGNIFICANT CHANGE UP (ref 3.8–10.5)
WBC # FLD AUTO: 8.92 K/UL — SIGNIFICANT CHANGE UP (ref 3.8–10.5)

## 2021-11-30 PROCEDURE — 74183 MRI ABD W/O CNTR FLWD CNTR: CPT | Mod: 26

## 2021-11-30 PROCEDURE — 99232 SBSQ HOSP IP/OBS MODERATE 35: CPT | Mod: GC

## 2021-11-30 PROCEDURE — 99232 SBSQ HOSP IP/OBS MODERATE 35: CPT

## 2021-11-30 RX ADMIN — AMLODIPINE BESYLATE 5 MILLIGRAM(S): 2.5 TABLET ORAL at 05:21

## 2021-11-30 RX ADMIN — FAMOTIDINE 20 MILLIGRAM(S): 10 INJECTION INTRAVENOUS at 05:21

## 2021-11-30 RX ADMIN — Medication 1: at 12:41

## 2021-11-30 RX ADMIN — FAMOTIDINE 20 MILLIGRAM(S): 10 INJECTION INTRAVENOUS at 17:05

## 2021-11-30 NOTE — PROGRESS NOTE ADULT - PROBLEM SELECTOR PLAN 3
Hx of T2DM on metformin 500mg BID.  -A1c 7  - Lipid panel with low HDL at 33 with total cholesterol WNL  -ISS, FSq6 while NPO

## 2021-11-30 NOTE — PROGRESS NOTE ADULT - SUBJECTIVE AND OBJECTIVE BOX
Interval Events:   No new complaints     Hospital Medications:  acetaminophen     Tablet .. 650 milliGRAM(s) Oral every 6 hours PRN  aluminum hydroxide/magnesium hydroxide/simethicone Suspension 30 milliLiter(s) Oral every 4 hours PRN  amLODIPine   Tablet 5 milliGRAM(s) Oral daily  dextrose 40% Gel 15 Gram(s) Oral once  dextrose 5%. 1000 milliLiter(s) IV Continuous <Continuous>  dextrose 5%. 1000 milliLiter(s) IV Continuous <Continuous>  dextrose 50% Injectable 25 Gram(s) IV Push once  dextrose 50% Injectable 12.5 Gram(s) IV Push once  dextrose 50% Injectable 25 Gram(s) IV Push once  famotidine    Tablet 20 milliGRAM(s) Oral two times a day  glucagon  Injectable 1 milliGRAM(s) IntraMuscular once  insulin lispro (ADMELOG) corrective regimen sliding scale   SubCutaneous three times a day before meals  lactated ringers. 1000 milliLiter(s) IV Continuous <Continuous>  oxyCODONE    IR 2.5 milliGRAM(s) Oral every 6 hours PRN  oxyCODONE    IR 5 milliGRAM(s) Oral every 8 hours PRN        ROS:   General:  No fevers, chills or night sweats  ENT:  No sore throat or dysphagia  CV:  No pain or palpitations  Resp:  No dyspnea, cough or  wheezing  GI:  as above  Skin:  No rash or edema  Neuro: no weakness   Hematologic: no bleeding  Musculoskeletal: no muscle pain or join pain  Psych: no agitation      PHYSICAL EXAM:   Vital Signs:  Vital Signs Last 24 Hrs  T(C): 36.7 (30 Nov 2021 05:20), Max: 36.8 (29 Nov 2021 20:06)  T(F): 98.1 (30 Nov 2021 05:20), Max: 98.3 (29 Nov 2021 20:06)  HR: 98 (30 Nov 2021 05:20) (85 - 100)  BP: 127/93 (30 Nov 2021 05:20) (124/85 - 143/84)  BP(mean): --  RR: 18 (30 Nov 2021 05:20) (18 - 18)  SpO2: 96% (30 Nov 2021 05:20) (96% - 100%)  Daily Height in cm: 170.18 (29 Nov 2021 20:06)    Daily     GENERAL:  NAD, Appears stated age  HEENT:  NC/AT,  conjunctivae clear and pink, sclera -anicteric  CHEST:  Normal Effort, Breath sounds clear  HEART:  RRR, S1 + S2, no murmurs  ABDOMEN:  Soft, non-tender, non-distended, normoactive bowel sounds,  no masses  EXTREMITIES:  no cyanosis or edema  SKIN:  Warm & Dry. No rash or erythema  NEURO:  Alert, oriented, no focal deficit    LABS:                        15.5   8.92  )-----------( 281      ( 30 Nov 2021 07:28 )             46.3     Mean Cell Volume: 89.9 fL (11-30-21 @ 07:28)    11-30    137  |  100  |  15  ----------------------------<  110<H>  3.9   |  24  |  1.00    Ca    9.5      30 Nov 2021 07:28  Phos  3.3     11-30  Mg     2.40     11-30    TPro  8.1  /  Alb  4.2  /  TBili  0.5  /  DBili  x   /  AST  26  /  ALT  34  /  AlkPhos  68  11-30    LIVER FUNCTIONS - ( 30 Nov 2021 07:28 )  Alb: 4.2 g/dL / Pro: 8.1 g/dL / ALK PHOS: 68 U/L / ALT: 34 U/L / AST: 26 U/L / GGT: x           PT/INR - ( 29 Nov 2021 06:56 )   PT: 13.8 sec;   INR: 1.21 ratio         PTT - ( 29 Nov 2021 06:56 )  PTT:31.3 sec                            15.5   8.92  )-----------( 281      ( 30 Nov 2021 07:28 )             46.3                         14.6   8.52  )-----------( 247      ( 29 Nov 2021 06:56 )             42.4                         13.3   9.12  )-----------( 263      ( 28 Nov 2021 16:37 )             41.4       Imaging:

## 2021-11-30 NOTE — PROGRESS NOTE ADULT - PROBLEM SELECTOR PLAN 1
Found to have GB mass with hepatic invasion. Mild intrahepatic duct dilation. Currently no signs of obstructive jaundice. High concern for malignancy vs less likely chronic cholecystitis.  - Chest CT w/o evidence of disease in thorax  -, CEA, AFP elevated  -Tylenol q6h PRN mild pain  -Oxy 2.5mg/5mg mod/severe pain, opiate naive  -C/w pepcid BID  -C/w maalox PRN  -DVT ppx, mechanical for now in lieu of possible bx  - ordered MRI abdomen, MRCP for further evaluation of mass  - EUS FNA biopsy Wednesday with [facility]

## 2021-11-30 NOTE — PROGRESS NOTE ADULT - ASSESSMENT
Impression:  # 3.5 x 3.0 cm mass replacing the gallbladder and invading the adjacent liver parenchyma causing mild intrahepatic dilatation.   # 2.8 x 1.5 cm portal lymph node and Portacaval lymph node measures 2.6 x 0.9 cm .    Recommendations:  - Recommend MRI abdomen with and without contrast for better evaluation of anatomy and involved structures  - Will plan for EUS  + FNB on Wednesday vs Thursday pending MRI result   - NPO after midnight       Artur Acevedo MD  Gastroenterology Fellow  Pager: [phone]  Please call answering service [phone] / on-call GI fellow after 5pm and before 8am, and on weekends.

## 2021-11-30 NOTE — CHART NOTE - NSCHARTNOTEFT_GEN_A_CORE
66 M mass replacing the gallbladder and invading the adjacent liver parenchyma, IR consulted for biopsy.    Noted GI plans 11/29 for "EUS +FNB on Wednesday".     follow up MRI  please reconsult for percutaneous biopsy if diagnosis is not able to be obtained endoscopically

## 2021-11-30 NOTE — PROGRESS NOTE ADULT - SUBJECTIVE AND OBJECTIVE BOX
HPI:  65yo Male with Hx of __ who p/w __, now POD_ from     24h Events:   - Overnight, no acute events    Subjective:   Patient examined at bedside this AM. Reports     Objective:  Vital Signs  T(C): 36.7 (11-30 @ 05:20), Max: 36.8 (11-29 @ 20:06)  HR: 98 (11-30 @ 05:20) (85 - 100)  BP: 127/93 (11-30 @ 05:20) (124/85 - 143/84)  RR: 18 (11-30 @ 05:20) (18 - 18)  SpO2: 96% (11-30 @ 05:20) (96% - 100%)    Physical Exam:  GEN: resting in bed comfortably in NAD  RESP: no increased WOB  ABD: soft, non-distended, non-tender without rebound tenderness or guarding  LLE:   RLE:   NEURO: AAOx4    Labs:                        15.5   8.92  )-----------( 281      ( 30 Nov 2021 07:28 )             46.3   11-30    137  |  100  |  15  ----------------------------<  110<H>  3.9   |  24  |  1.00    Ca    9.5      30 Nov 2021 07:28  Phos  3.3     11-30  Mg     2.40     11-30    TPro  8.1  /  Alb  4.2  /  TBili  0.5  /  DBili  x   /  AST  26  /  ALT  34  /  AlkPhos  68  11-30    CAPILLARY BLOOD GLUCOSE      POCT Blood Glucose.: 102 mg/dL (30 Nov 2021 08:41)  POCT Blood Glucose.: 197 mg/dL (29 Nov 2021 21:59)  POCT Blood Glucose.: 93 mg/dL (29 Nov 2021 17:38)  POCT Blood Glucose.: 87 mg/dL (29 Nov 2021 12:49)      Medications:   MEDICATIONS  (STANDING):  amLODIPine   Tablet 5 milliGRAM(s) Oral daily  dextrose 40% Gel 15 Gram(s) Oral once  dextrose 5%. 1000 milliLiter(s) (50 mL/Hr) IV Continuous <Continuous>  dextrose 5%. 1000 milliLiter(s) (100 mL/Hr) IV Continuous <Continuous>  dextrose 50% Injectable 25 Gram(s) IV Push once  dextrose 50% Injectable 12.5 Gram(s) IV Push once  dextrose 50% Injectable 25 Gram(s) IV Push once  famotidine    Tablet 20 milliGRAM(s) Oral two times a day  glucagon  Injectable 1 milliGRAM(s) IntraMuscular once  insulin lispro (ADMELOG) corrective regimen sliding scale   SubCutaneous three times a day before meals  lactated ringers. 1000 milliLiter(s) (50 mL/Hr) IV Continuous <Continuous>    MEDICATIONS  (PRN):  acetaminophen     Tablet .. 650 milliGRAM(s) Oral every 6 hours PRN Mild Pain (1 - 3)  aluminum hydroxide/magnesium hydroxide/simethicone Suspension 30 milliLiter(s) Oral every 4 hours PRN Dyspepsia  oxyCODONE    IR 2.5 milliGRAM(s) Oral every 6 hours PRN Moderate Pain (4 - 6)  oxyCODONE    IR 5 milliGRAM(s) Oral every 8 hours PRN Severe Pain (7 - 10)      Assessment:   66M w/ hx of HTN, DM, GERD, presenting with abdominal pain for 5-6 months and imaging with 3.5cm gallbladder mass.    - MRCP to better evaluate anatomy and involved structures  - CT chest noted  - Tumor markers noted  - F/u EUS/biopsy  - Discussed with attending, Dr. Davies  - Will follow    Surgical Oncology / D team surgery  Pager [phone] 24h Events:   - Overnight, no acute events    Subjective:   Patient examined at bedside this AM.     Objective:  Vital Signs  T(C): 36.7 (11-30 @ 05:20), Max: 36.8 (11-29 @ 20:06)  HR: 98 (11-30 @ 05:20) (85 - 100)  BP: 127/93 (11-30 @ 05:20) (124/85 - 143/84)  RR: 18 (11-30 @ 05:20) (18 - 18)  SpO2: 96% (11-30 @ 05:20) (96% - 100%)    Physical Exam:  GEN: resting in bed comfortably in NAD  RESP: no increased WOB  ABD: soft, non-distended, slightly tender in epigastrium and RUQ without rebound tenderness or guarding  NEURO: AAOx4    Labs:                        15.5   8.92  )-----------( 281      ( 30 Nov 2021 07:28 )             46.3   11-30    137  |  100  |  15  ----------------------------<  110<H>  3.9   |  24  |  1.00    Ca    9.5      30 Nov 2021 07:28  Phos  3.3     11-30  Mg     2.40     11-30    TPro  8.1  /  Alb  4.2  /  TBili  0.5  /  DBili  x   /  AST  26  /  ALT  34  /  AlkPhos  68  11-30    CAPILLARY BLOOD GLUCOSE      POCT Blood Glucose.: 102 mg/dL (30 Nov 2021 08:41)  POCT Blood Glucose.: 197 mg/dL (29 Nov 2021 21:59)  POCT Blood Glucose.: 93 mg/dL (29 Nov 2021 17:38)  POCT Blood Glucose.: 87 mg/dL (29 Nov 2021 12:49)      Medications:   MEDICATIONS  (STANDING):  amLODIPine   Tablet 5 milliGRAM(s) Oral daily  dextrose 40% Gel 15 Gram(s) Oral once  dextrose 5%. 1000 milliLiter(s) (50 mL/Hr) IV Continuous <Continuous>  dextrose 5%. 1000 milliLiter(s) (100 mL/Hr) IV Continuous <Continuous>  dextrose 50% Injectable 25 Gram(s) IV Push once  dextrose 50% Injectable 12.5 Gram(s) IV Push once  dextrose 50% Injectable 25 Gram(s) IV Push once  famotidine    Tablet 20 milliGRAM(s) Oral two times a day  glucagon  Injectable 1 milliGRAM(s) IntraMuscular once  insulin lispro (ADMELOG) corrective regimen sliding scale   SubCutaneous three times a day before meals  lactated ringers. 1000 milliLiter(s) (50 mL/Hr) IV Continuous <Continuous>    MEDICATIONS  (PRN):  acetaminophen     Tablet .. 650 milliGRAM(s) Oral every 6 hours PRN Mild Pain (1 - 3)  aluminum hydroxide/magnesium hydroxide/simethicone Suspension 30 milliLiter(s) Oral every 4 hours PRN Dyspepsia  oxyCODONE    IR 2.5 milliGRAM(s) Oral every 6 hours PRN Moderate Pain (4 - 6)  oxyCODONE    IR 5 milliGRAM(s) Oral every 8 hours PRN Severe Pain (7 - 10)      Assessment:   66M w/ hx of HTN, DM, GERD, presenting with abdominal pain for 5-6 months and imaging with 3.5cm gallbladder mass.    - MRCP to better evaluate anatomy and involved structures  - CT chest noted  - Tumor markers noted  - F/u EUS/biopsy  - Discussed with attending, Dr. Davies  - Will follow    Surgical Oncology / D team surgery  Pager [phone]

## 2021-11-30 NOTE — PROGRESS NOTE ADULT - SUBJECTIVE AND OBJECTIVE BOX
Maribeth Rojas MD  Internal Medicine, PGY1  Universal pager: [phone] / [facility]: [phone]      Patient is a 66y old  Male who presents with a chief complaint of Abdominal pain (28 Nov 2021 21:11)      SUBJECTIVE: Pt admitted overnight. Denies F/C, lightheadedness, HA, CP, SOB, N/V/C/D, burning w/ urination, leg swelling. Pt wants to get biopsy    focused ROS as above    MEDICATIONS  (STANDING):  amLODIPine   Tablet 5 milliGRAM(s) Oral daily  dextrose 40% Gel 15 Gram(s) Oral once  dextrose 5%. 1000 milliLiter(s) (50 mL/Hr) IV Continuous <Continuous>  dextrose 5%. 1000 milliLiter(s) (100 mL/Hr) IV Continuous <Continuous>  dextrose 50% Injectable 25 Gram(s) IV Push once  dextrose 50% Injectable 12.5 Gram(s) IV Push once  dextrose 50% Injectable 25 Gram(s) IV Push once  famotidine    Tablet 20 milliGRAM(s) Oral two times a day  glucagon  Injectable 1 milliGRAM(s) IntraMuscular once  insulin lispro (ADMELOG) corrective regimen sliding scale   SubCutaneous every 6 hours  lactated ringers. 1000 milliLiter(s) (50 mL/Hr) IV Continuous <Continuous>    MEDICATIONS  (PRN):  acetaminophen     Tablet .. 650 milliGRAM(s) Oral every 6 hours PRN Mild Pain (1 - 3)  aluminum hydroxide/magnesium hydroxide/simethicone Suspension 30 milliLiter(s) Oral every 4 hours PRN Dyspepsia  oxyCODONE    IR 2.5 milliGRAM(s) Oral every 6 hours PRN Moderate Pain (4 - 6)  oxyCODONE    IR 5 milliGRAM(s) Oral every 8 hours PRN Severe Pain (7 - 10)      OBJECTIVE:  T(C): 36.6 (11-29-21 @ 04:55), Max: 36.9 (11-28-21 @ 17:28)  HR: 89 (11-29-21 @ 04:55) (74 - 100)  BP: 137/87 (11-29-21 @ 04:55) (120/92 - 137/87)  RR: 16 (11-29-21 @ 04:55) (15 - 16)  SpO2: 99% (11-29-21 @ 04:55) (95% - 100%)    PHYSICAL EXAM  GENERAL: NAD   HEAD:  Atraumatic, normocephalic  EYES: EOMI, sclera clear  CHEST/LUNG: Clear to auscultation bilaterally; no wheeze  HEART: RRR; S1, S2; no M/R/G  ABDOMEN: mildly distended abdomen with tenderness to palpation around the umbilicus and epigastric area; no masses. BS present  EXTREMITIES:  2+ Peripheral Pulses; no edema  NEURO: non-focal, AAOx3  PSYCH: appropriate affect  SKIN: No rashes or lesions    LABS:  CAPILLARY BLOOD GLUCOSE      POCT Blood Glucose.: 90 mg/dL (29 Nov 2021 04:23)  POCT Blood Glucose.: 95 mg/dL (28 Nov 2021 22:25)  POCT Blood Glucose.: 126 mg/dL (28 Nov 2021 14:00)    I&O's Summary    28 Nov 2021 07:01  -  29 Nov 2021 07:00  --------------------------------------------------------  IN: 0 mL / OUT: 400 mL / NET: -400 mL                            14.6   8.52  )-----------( 247      ( 29 Nov 2021 06:56 )             42.4     11-28    137  |  103  |  15  ----------------------------<  108<H>  4.3   |  24  |  1.01    Ca    9.3      28 Nov 2021 16:37    TPro  7.2  /  Alb  3.7  /  TBili  0.2  /  DBili  x   /  AST  20  /  ALT  39  /  AlkPhos  60  11-28    PT/INR - ( 29 Nov 2021 06:56 )   PT: 13.8 sec;   INR: 1.21 ratio         PTT - ( 29 Nov 2021 06:56 )  PTT:31.3 sec          Microbiology:      RADIOLOGY & ADDITIONAL TESTS:    Imaging Personally Reviewed:  Consultant(s) Notes Reviewed:    Care Discussed with Consultants/Other Providers: Maribeth Rojas MD  Internal Medicine, PGY1  Universal pager: [phone] / [facility]: [phone]      Patient is a 66y old  Male who presents with a chief complaint of Abdominal pain (28 Nov 2021 21:11)      SUBJECTIVE: Pt complains of abdominal pressure this morning as previous. Denies F/C, lightheadedness, HA, CP, SOB, N/V/C/D, burning w/ urination, leg swelling. Says urine is dark in color.  Pt wants to get biopsy.     focused ROS as above    MEDICATIONS  (STANDING):  amLODIPine   Tablet 5 milliGRAM(s) Oral daily  dextrose 40% Gel 15 Gram(s) Oral once  dextrose 5%. 1000 milliLiter(s) (50 mL/Hr) IV Continuous <Continuous>  dextrose 5%. 1000 milliLiter(s) (100 mL/Hr) IV Continuous <Continuous>  dextrose 50% Injectable 25 Gram(s) IV Push once  dextrose 50% Injectable 12.5 Gram(s) IV Push once  dextrose 50% Injectable 25 Gram(s) IV Push once  famotidine    Tablet 20 milliGRAM(s) Oral two times a day  glucagon  Injectable 1 milliGRAM(s) IntraMuscular once  insulin lispro (ADMELOG) corrective regimen sliding scale   SubCutaneous every 6 hours  lactated ringers. 1000 milliLiter(s) (50 mL/Hr) IV Continuous <Continuous>    MEDICATIONS  (PRN):  acetaminophen     Tablet .. 650 milliGRAM(s) Oral every 6 hours PRN Mild Pain (1 - 3)  aluminum hydroxide/magnesium hydroxide/simethicone Suspension 30 milliLiter(s) Oral every 4 hours PRN Dyspepsia  oxyCODONE    IR 2.5 milliGRAM(s) Oral every 6 hours PRN Moderate Pain (4 - 6)  oxyCODONE    IR 5 milliGRAM(s) Oral every 8 hours PRN Severe Pain (7 - 10)      Vital Signs Last 24 Hrs  T(C): 36.7 (30 Nov 2021 13:06), Max: 36.8 (29 Nov 2021 20:06)  T(F): 98.1 (30 Nov 2021 13:06), Max: 98.3 (29 Nov 2021 20:06)  HR: 114 (30 Nov 2021 13:06) (85 - 114)  BP: 127/90 (30 Nov 2021 13:06) (124/85 - 143/84)  BP(mean): --  RR: 18 (30 Nov 2021 13:06) (18 - 18)  SpO2: 97% (30 Nov 2021 13:06) (96% - 100%)    PHYSICAL EXAM  GENERAL: NAD   HEAD:  Atraumatic, normocephalic  EYES: EOMI, sclera clear  CHEST/LUNG: Clear to auscultation bilaterally; no wheeze  HEART: RRR; S1, S2; no M/R/G  ABDOMEN: mildly distended abdomen with tenderness to palpation in LUQ and LLQ; no masses. BS present  EXTREMITIES:  2+ Peripheral Pulses; no edema  NEURO: non-focal, AAOx3  PSYCH: appropriate affect  SKIN: No rashes or lesions    LABS:  CAPILLARY BLOOD GLUCOSE      POCT Blood Glucose.: 90 mg/dL (29 Nov 2021 04:23)  POCT Blood Glucose.: 95 mg/dL (28 Nov 2021 22:25)  POCT Blood Glucose.: 126 mg/dL (28 Nov 2021 14:00)    I&O's Summary    28 Nov 2021 07:01  -  29 Nov 2021 07:00  --------------------------------------------------------  IN: 0 mL / OUT: 400 mL / NET: -400 mL                            14.6   8.52  )-----------( 247      ( 29 Nov 2021 06:56 )             42.4     11-28    137  |  103  |  15  ----------------------------<  108<H>  4.3   |  24  |  1.01    Ca    9.3      28 Nov 2021 16:37    TPro  7.2  /  Alb  3.7  /  TBili  0.2  /  DBili  x   /  AST  20  /  ALT  39  /  AlkPhos  60  11-28    PT/INR - ( 29 Nov 2021 06:56 )   PT: 13.8 sec;   INR: 1.21 ratio         PTT - ( 29 Nov 2021 06:56 )  PTT:31.3 sec          Microbiology:      RADIOLOGY & ADDITIONAL TESTS:    Imaging Personally Reviewed:  Consultant(s) Notes Reviewed:    Care Discussed with Consultants/Other Providers:

## 2021-12-01 ENCOUNTER — RESULT REVIEW (OUTPATIENT)
Age: 66
End: 2021-12-01

## 2021-12-01 LAB
ALBUMIN SERPL ELPH-MCNC: 3.7 G/DL — SIGNIFICANT CHANGE UP (ref 3.3–5)
ALP SERPL-CCNC: 63 U/L — SIGNIFICANT CHANGE UP (ref 40–120)
ALT FLD-CCNC: 26 U/L — SIGNIFICANT CHANGE UP (ref 4–41)
ANION GAP SERPL CALC-SCNC: 11 MMOL/L — SIGNIFICANT CHANGE UP (ref 7–14)
APTT BLD: 30.2 SEC — SIGNIFICANT CHANGE UP (ref 27–36.3)
AST SERPL-CCNC: 19 U/L — SIGNIFICANT CHANGE UP (ref 4–40)
BILIRUB SERPL-MCNC: <0.2 MG/DL — SIGNIFICANT CHANGE UP (ref 0.2–1.2)
BLD GP AB SCN SERPL QL: NEGATIVE — SIGNIFICANT CHANGE UP
BUN SERPL-MCNC: 15 MG/DL — SIGNIFICANT CHANGE UP (ref 7–23)
CALCIUM SERPL-MCNC: 9.2 MG/DL — SIGNIFICANT CHANGE UP (ref 8.4–10.5)
CHLORIDE SERPL-SCNC: 102 MMOL/L — SIGNIFICANT CHANGE UP (ref 98–107)
CO2 SERPL-SCNC: 23 MMOL/L — SIGNIFICANT CHANGE UP (ref 22–31)
CREAT SERPL-MCNC: 0.88 MG/DL — SIGNIFICANT CHANGE UP (ref 0.5–1.3)
GLUCOSE BLDC GLUCOMTR-MCNC: 110 MG/DL — HIGH (ref 70–99)
GLUCOSE BLDC GLUCOMTR-MCNC: 112 MG/DL — HIGH (ref 70–99)
GLUCOSE BLDC GLUCOMTR-MCNC: 118 MG/DL — HIGH (ref 70–99)
GLUCOSE BLDC GLUCOMTR-MCNC: 118 MG/DL — HIGH (ref 70–99)
GLUCOSE BLDC GLUCOMTR-MCNC: 133 MG/DL — HIGH (ref 70–99)
GLUCOSE BLDC GLUCOMTR-MCNC: 229 MG/DL — HIGH (ref 70–99)
GLUCOSE BLDC GLUCOMTR-MCNC: 93 MG/DL — SIGNIFICANT CHANGE UP (ref 70–99)
GLUCOSE SERPL-MCNC: 130 MG/DL — HIGH (ref 70–99)
HCT VFR BLD CALC: 41.3 % — SIGNIFICANT CHANGE UP (ref 39–50)
HGB BLD-MCNC: 14.1 G/DL — SIGNIFICANT CHANGE UP (ref 13–17)
INR BLD: 1.09 RATIO — SIGNIFICANT CHANGE UP (ref 0.88–1.16)
MAGNESIUM SERPL-MCNC: 2.1 MG/DL — SIGNIFICANT CHANGE UP (ref 1.6–2.6)
MCHC RBC-ENTMCNC: 30.8 PG — SIGNIFICANT CHANGE UP (ref 27–34)
MCHC RBC-ENTMCNC: 34.1 GM/DL — SIGNIFICANT CHANGE UP (ref 32–36)
MCV RBC AUTO: 90.2 FL — SIGNIFICANT CHANGE UP (ref 80–100)
NRBC # BLD: 0 /100 WBCS — SIGNIFICANT CHANGE UP
NRBC # FLD: 0 K/UL — SIGNIFICANT CHANGE UP
PHOSPHATE SERPL-MCNC: 3.5 MG/DL — SIGNIFICANT CHANGE UP (ref 2.5–4.5)
PLATELET # BLD AUTO: 248 K/UL — SIGNIFICANT CHANGE UP (ref 150–400)
POTASSIUM SERPL-MCNC: 3.8 MMOL/L — SIGNIFICANT CHANGE UP (ref 3.5–5.3)
POTASSIUM SERPL-SCNC: 3.8 MMOL/L — SIGNIFICANT CHANGE UP (ref 3.5–5.3)
PROT SERPL-MCNC: 7.2 G/DL — SIGNIFICANT CHANGE UP (ref 6–8.3)
PROTHROM AB SERPL-ACNC: 12.5 SEC — SIGNIFICANT CHANGE UP (ref 10.6–13.6)
RBC # BLD: 4.58 M/UL — SIGNIFICANT CHANGE UP (ref 4.2–5.8)
RBC # FLD: 13.6 % — SIGNIFICANT CHANGE UP (ref 10.3–14.5)
RH IG SCN BLD-IMP: POSITIVE — SIGNIFICANT CHANGE UP
SODIUM SERPL-SCNC: 136 MMOL/L — SIGNIFICANT CHANGE UP (ref 135–145)
WBC # BLD: 8.55 K/UL — SIGNIFICANT CHANGE UP (ref 3.8–10.5)
WBC # FLD AUTO: 8.55 K/UL — SIGNIFICANT CHANGE UP (ref 3.8–10.5)

## 2021-12-01 PROCEDURE — 99232 SBSQ HOSP IP/OBS MODERATE 35: CPT | Mod: GC

## 2021-12-01 PROCEDURE — 88305 TISSUE EXAM BY PATHOLOGIST: CPT | Mod: 26

## 2021-12-01 PROCEDURE — 43239 EGD BIOPSY SINGLE/MULTIPLE: CPT | Mod: GC

## 2021-12-01 PROCEDURE — 88341 IMHCHEM/IMCYTCHM EA ADD ANTB: CPT | Mod: 26

## 2021-12-01 PROCEDURE — 88342 IMHCHEM/IMCYTCHM 1ST ANTB: CPT | Mod: 26

## 2021-12-01 PROCEDURE — 99232 SBSQ HOSP IP/OBS MODERATE 35: CPT

## 2021-12-01 RX ORDER — ENOXAPARIN SODIUM 100 MG/ML
40 INJECTION SUBCUTANEOUS DAILY
Refills: 0 | Status: DISCONTINUED | OUTPATIENT
Start: 2021-12-01 | End: 2021-12-03

## 2021-12-01 RX ORDER — PANTOPRAZOLE SODIUM 20 MG/1
40 TABLET, DELAYED RELEASE ORAL
Refills: 0 | Status: DISCONTINUED | OUTPATIENT
Start: 2021-12-01 | End: 2021-12-03

## 2021-12-01 RX ORDER — INSULIN LISPRO 100/ML
VIAL (ML) SUBCUTANEOUS AT BEDTIME
Refills: 0 | Status: DISCONTINUED | OUTPATIENT
Start: 2021-12-01 | End: 2021-12-03

## 2021-12-01 RX ADMIN — FAMOTIDINE 20 MILLIGRAM(S): 10 INJECTION INTRAVENOUS at 17:35

## 2021-12-01 RX ADMIN — AMLODIPINE BESYLATE 5 MILLIGRAM(S): 2.5 TABLET ORAL at 06:10

## 2021-12-01 RX ADMIN — ENOXAPARIN SODIUM 40 MILLIGRAM(S): 100 INJECTION SUBCUTANEOUS at 17:35

## 2021-12-01 RX ADMIN — Medication 2: at 18:03

## 2021-12-01 RX ADMIN — FAMOTIDINE 20 MILLIGRAM(S): 10 INJECTION INTRAVENOUS at 06:10

## 2021-12-01 NOTE — PROGRESS NOTE ADULT - SUBJECTIVE AND OBJECTIVE BOX
Maribeth Rojas MD  Internal Medicine, PGY1  Universal pager: [phone] / [facility]: [phone]      Patient is a 66y old  Male who presents with a chief complaint of Abdominal pain (28 Nov 2021 21:11)      SUBJECTIVE: Pt complains of abdominal pressure this morning as previous. Denies F/C, lightheadedness, HA, CP, SOB, N/V/C/D, burning w/ urination, leg swelling. Says urine is dark in color.  Pt wants to get biopsy.     focused ROS as above    MEDICATIONS  (STANDING):  amLODIPine   Tablet 5 milliGRAM(s) Oral daily  dextrose 40% Gel 15 Gram(s) Oral once  dextrose 5%. 1000 milliLiter(s) (50 mL/Hr) IV Continuous <Continuous>  dextrose 5%. 1000 milliLiter(s) (100 mL/Hr) IV Continuous <Continuous>  dextrose 50% Injectable 25 Gram(s) IV Push once  dextrose 50% Injectable 12.5 Gram(s) IV Push once  dextrose 50% Injectable 25 Gram(s) IV Push once  famotidine    Tablet 20 milliGRAM(s) Oral two times a day  glucagon  Injectable 1 milliGRAM(s) IntraMuscular once  insulin lispro (ADMELOG) corrective regimen sliding scale   SubCutaneous every 6 hours  lactated ringers. 1000 milliLiter(s) (50 mL/Hr) IV Continuous <Continuous>    MEDICATIONS  (PRN):  acetaminophen     Tablet .. 650 milliGRAM(s) Oral every 6 hours PRN Mild Pain (1 - 3)  aluminum hydroxide/magnesium hydroxide/simethicone Suspension 30 milliLiter(s) Oral every 4 hours PRN Dyspepsia  oxyCODONE    IR 2.5 milliGRAM(s) Oral every 6 hours PRN Moderate Pain (4 - 6)  oxyCODONE    IR 5 milliGRAM(s) Oral every 8 hours PRN Severe Pain (7 - 10)      Vital Signs Last 24 Hrs  T(C): 36.7 (30 Nov 2021 13:06), Max: 36.8 (29 Nov 2021 20:06)  T(F): 98.1 (30 Nov 2021 13:06), Max: 98.3 (29 Nov 2021 20:06)  HR: 114 (30 Nov 2021 13:06) (85 - 114)  BP: 127/90 (30 Nov 2021 13:06) (124/85 - 143/84)  BP(mean): --  RR: 18 (30 Nov 2021 13:06) (18 - 18)  SpO2: 97% (30 Nov 2021 13:06) (96% - 100%)    PHYSICAL EXAM  GENERAL: NAD   HEAD:  Atraumatic, normocephalic  EYES: EOMI, sclera clear  CHEST/LUNG: Clear to auscultation bilaterally; no wheeze  HEART: RRR; S1, S2; no M/R/G  ABDOMEN: mildly distended abdomen with tenderness to palpation in LUQ and LLQ; no masses. BS present  EXTREMITIES:  2+ Peripheral Pulses; no edema  NEURO: non-focal, AAOx3  PSYCH: appropriate affect  SKIN: No rashes or lesions    LABS:  CAPILLARY BLOOD GLUCOSE      POCT Blood Glucose.: 90 mg/dL (29 Nov 2021 04:23)  POCT Blood Glucose.: 95 mg/dL (28 Nov 2021 22:25)  POCT Blood Glucose.: 126 mg/dL (28 Nov 2021 14:00)    I&O's Summary    28 Nov 2021 07:01  -  29 Nov 2021 07:00  --------------------------------------------------------  IN: 0 mL / OUT: 400 mL / NET: -400 mL                            14.6   8.52  )-----------( 247      ( 29 Nov 2021 06:56 )             42.4     11-28    137  |  103  |  15  ----------------------------<  108<H>  4.3   |  24  |  1.01    Ca    9.3      28 Nov 2021 16:37    TPro  7.2  /  Alb  3.7  /  TBili  0.2  /  DBili  x   /  AST  20  /  ALT  39  /  AlkPhos  60  11-28    PT/INR - ( 29 Nov 2021 06:56 )   PT: 13.8 sec;   INR: 1.21 ratio         PTT - ( 29 Nov 2021 06:56 )  PTT:31.3 sec          Microbiology:      RADIOLOGY & ADDITIONAL TESTS:    Imaging Personally Reviewed:  Consultant(s) Notes Reviewed:    Care Discussed with Consultants/Other Providers:

## 2021-12-01 NOTE — PROGRESS NOTE ADULT - SUBJECTIVE AND OBJECTIVE BOX
24h Events:   - Overnight, no acute events    Subjective:   Patient examined at bedside this AM. Reports no pain, denies N/V    Objective:  Vital Signs  T(C): 36.7 (12-01 @ 06:06), Max: 36.7 (11-30 @ 13:06)  HR: 95 (12-01 @ 06:06) (95 - 114)  BP: 121/82 (12-01 @ 06:06) (121/82 - 144/89)  RR: 18 (12-01 @ 06:06) (18 - 18)  SpO2: 96% (12-01 @ 06:06) (96% - 97%)  11-30-21 @ 07:01  -  12-01-21 @ 07:00  --------------------------------------------------------  IN:  Total IN: 0 mL    OUT:    Voided (mL): 600 mL  Total OUT: 600 mL    Total NET: -600 mL          Physical Exam:  GEN: resting in bed comfortably in NAD  RESP: no increased WOB  ABD: soft, non-distended, non-tender without rebound tenderness or guarding  NEURO: AAOx4    Labs:                        14.1   8.55  )-----------( 248      ( 01 Dec 2021 04:38 )             41.3   12-01    136  |  102  |  15  ----------------------------<  130<H>  3.8   |  23  |  0.88    Ca    9.2      01 Dec 2021 04:38  Phos  3.5     12-01  Mg     2.10     12-01    TPro  7.2  /  Alb  3.7  /  TBili  <0.2  /  DBili  x   /  AST  19  /  ALT  26  /  AlkPhos  63  12-01    CAPILLARY BLOOD GLUCOSE      POCT Blood Glucose.: 110 mg/dL (01 Dec 2021 06:28)  POCT Blood Glucose.: 181 mg/dL (30 Nov 2021 21:07)  POCT Blood Glucose.: 223 mg/dL (30 Nov 2021 21:06)  POCT Blood Glucose.: 150 mg/dL (30 Nov 2021 18:35)  POCT Blood Glucose.: 195 mg/dL (30 Nov 2021 12:27)  POCT Blood Glucose.: 102 mg/dL (30 Nov 2021 08:41)      Medications:   MEDICATIONS  (STANDING):  amLODIPine   Tablet 5 milliGRAM(s) Oral daily  dextrose 40% Gel 15 Gram(s) Oral once  dextrose 5%. 1000 milliLiter(s) (50 mL/Hr) IV Continuous <Continuous>  dextrose 5%. 1000 milliLiter(s) (100 mL/Hr) IV Continuous <Continuous>  dextrose 50% Injectable 25 Gram(s) IV Push once  dextrose 50% Injectable 12.5 Gram(s) IV Push once  dextrose 50% Injectable 25 Gram(s) IV Push once  famotidine    Tablet 20 milliGRAM(s) Oral two times a day  glucagon  Injectable 1 milliGRAM(s) IntraMuscular once  insulin lispro (ADMELOG) corrective regimen sliding scale   SubCutaneous three times a day before meals  lactated ringers. 1000 milliLiter(s) (50 mL/Hr) IV Continuous <Continuous>    MEDICATIONS  (PRN):  acetaminophen     Tablet .. 650 milliGRAM(s) Oral every 6 hours PRN Mild Pain (1 - 3)  aluminum hydroxide/magnesium hydroxide/simethicone Suspension 30 milliLiter(s) Oral every 4 hours PRN Dyspepsia  oxyCODONE    IR 2.5 milliGRAM(s) Oral every 6 hours PRN Moderate Pain (4 - 6)  oxyCODONE    IR 5 milliGRAM(s) Oral every 8 hours PRN Severe Pain (7 - 10)      Assessment:   66M w/ hx of HTN, DM, GERD, presenting with abdominal pain for 5-6 months and imaging with 3.5cm gallbladder mass.    - MRCP done, will follow up official read  - CT chest noted without signs of pulm mets  - Tumor markers elevated  - F/u EUS/biopsy, plan for today  - Discussed with attending, Dr. Davies  - Will follow    Surgical Oncology  Pager [phone]

## 2021-12-01 NOTE — ASU PREOP CHECKLIST - BOWEL PREP
n/a Modified Advancement Flap Text: The defect edges were debeveled with a #15 scalpel blade.  Given the location of the defect, shape of the defect and the proximity to free margins a modified advancement flap was deemed most appropriate.  Using a sterile surgical marker, an appropriate advancement flap was drawn incorporating the defect and placing the expected incisions within the relaxed skin tension lines where possible.    The area thus outlined was incised deep to adipose tissue with a #15 scalpel blade.  The skin margins were undermined to an appropriate distance in all directions utilizing iris scissors.

## 2021-12-01 NOTE — PROGRESS NOTE ADULT - PROBLEM SELECTOR PLAN 1
Found to have GB mass with hepatic invasion. Mild intrahepatic duct dilation. Currently no signs of obstructive jaundice. High concern for malignancy vs less likely chronic cholecystitis.  - Chest CT w/o evidence of disease in thorax  -, CEA, AFP elevated  -Tylenol q6h PRN mild pain  -Oxy 2.5mg/5mg mod/severe pain, opiate naive  -C/w pepcid BID  -C/w maalox PRN  -DVT ppx, mechanical for now in lieu of possible bx  - ordered MRI abdomen, MRCP for further evaluation of mass  - EUS FNA biopsy Wednesday with advanced GI Found to have GB mass with hepatic invasion. Mild intrahepatic duct dilation. Currently no signs of obstructive jaundice. High concern for malignancy vs less likely chronic cholecystitis.  - Chest CT w/o evidence of disease in thorax  -, CEA, AFP elevated  -Tylenol q6h PRN mild pain  -Oxy 2.5mg/5mg mod/severe pain, opiate naive  -C/w pepcid BID  -C/w maalox PRN  -DVT ppx, mechanical for now in lieu of possible bx  - MRI with 4 cm mass in the gallbladder fossa suspicious for primary gallbladder malignancy. Evidence of local invasion of the adjacent liver with melisa hepatis lymphadenopathy.  - EUS FNA biopsy today

## 2021-12-01 NOTE — PRE-OP CHECKLIST - BP NONINVASIVE SYSTOLIC (MM HG)
Lynne Chung is a 46 126

## 2021-12-02 ENCOUNTER — TRANSCRIPTION ENCOUNTER (OUTPATIENT)
Age: 66
End: 2021-12-02

## 2021-12-02 LAB
ALBUMIN SERPL ELPH-MCNC: 3.8 G/DL — SIGNIFICANT CHANGE UP (ref 3.3–5)
ALP SERPL-CCNC: 57 U/L — SIGNIFICANT CHANGE UP (ref 40–120)
ALT FLD-CCNC: 26 U/L — SIGNIFICANT CHANGE UP (ref 4–41)
ANION GAP SERPL CALC-SCNC: 11 MMOL/L — SIGNIFICANT CHANGE UP (ref 7–14)
AST SERPL-CCNC: 24 U/L — SIGNIFICANT CHANGE UP (ref 4–40)
BILIRUB DIRECT SERPL-MCNC: <0.2 MG/DL — SIGNIFICANT CHANGE UP (ref 0–0.3)
BILIRUB INDIRECT FLD-MCNC: >0.1 MG/DL — SIGNIFICANT CHANGE UP (ref 0–1)
BILIRUB SERPL-MCNC: 0.3 MG/DL — SIGNIFICANT CHANGE UP (ref 0.2–1.2)
BUN SERPL-MCNC: 9 MG/DL — SIGNIFICANT CHANGE UP (ref 7–23)
CALCIUM SERPL-MCNC: 9.4 MG/DL — SIGNIFICANT CHANGE UP (ref 8.4–10.5)
CHLORIDE SERPL-SCNC: 104 MMOL/L — SIGNIFICANT CHANGE UP (ref 98–107)
CO2 SERPL-SCNC: 24 MMOL/L — SIGNIFICANT CHANGE UP (ref 22–31)
CREAT SERPL-MCNC: 0.92 MG/DL — SIGNIFICANT CHANGE UP (ref 0.5–1.3)
GLUCOSE BLDC GLUCOMTR-MCNC: 119 MG/DL — HIGH (ref 70–99)
GLUCOSE BLDC GLUCOMTR-MCNC: 131 MG/DL — HIGH (ref 70–99)
GLUCOSE BLDC GLUCOMTR-MCNC: 163 MG/DL — HIGH (ref 70–99)
GLUCOSE BLDC GLUCOMTR-MCNC: 197 MG/DL — HIGH (ref 70–99)
GLUCOSE SERPL-MCNC: 111 MG/DL — HIGH (ref 70–99)
HCT VFR BLD CALC: 43.6 % — SIGNIFICANT CHANGE UP (ref 39–50)
HGB BLD-MCNC: 14.3 G/DL — SIGNIFICANT CHANGE UP (ref 13–17)
MAGNESIUM SERPL-MCNC: 2.1 MG/DL — SIGNIFICANT CHANGE UP (ref 1.6–2.6)
MCHC RBC-ENTMCNC: 30.8 PG — SIGNIFICANT CHANGE UP (ref 27–34)
MCHC RBC-ENTMCNC: 32.8 GM/DL — SIGNIFICANT CHANGE UP (ref 32–36)
MCV RBC AUTO: 94 FL — SIGNIFICANT CHANGE UP (ref 80–100)
NRBC # BLD: 0 /100 WBCS — SIGNIFICANT CHANGE UP
NRBC # FLD: 0 K/UL — SIGNIFICANT CHANGE UP
PHOSPHATE SERPL-MCNC: 3.8 MG/DL — SIGNIFICANT CHANGE UP (ref 2.5–4.5)
PLATELET # BLD AUTO: 256 K/UL — SIGNIFICANT CHANGE UP (ref 150–400)
POTASSIUM SERPL-MCNC: 3.8 MMOL/L — SIGNIFICANT CHANGE UP (ref 3.5–5.3)
POTASSIUM SERPL-SCNC: 3.8 MMOL/L — SIGNIFICANT CHANGE UP (ref 3.5–5.3)
PROT SERPL-MCNC: 7.1 G/DL — SIGNIFICANT CHANGE UP (ref 6–8.3)
RBC # BLD: 4.64 M/UL — SIGNIFICANT CHANGE UP (ref 4.2–5.8)
RBC # FLD: 13.8 % — SIGNIFICANT CHANGE UP (ref 10.3–14.5)
SODIUM SERPL-SCNC: 139 MMOL/L — SIGNIFICANT CHANGE UP (ref 135–145)
SURGICAL PATHOLOGY STUDY: SIGNIFICANT CHANGE UP
WBC # BLD: 8.61 K/UL — SIGNIFICANT CHANGE UP (ref 3.8–10.5)
WBC # FLD AUTO: 8.61 K/UL — SIGNIFICANT CHANGE UP (ref 3.8–10.5)

## 2021-12-02 PROCEDURE — 99232 SBSQ HOSP IP/OBS MODERATE 35: CPT

## 2021-12-02 PROCEDURE — 99232 SBSQ HOSP IP/OBS MODERATE 35: CPT | Mod: GC

## 2021-12-02 RX ADMIN — Medication 650 MILLIGRAM(S): at 08:35

## 2021-12-02 RX ADMIN — FAMOTIDINE 20 MILLIGRAM(S): 10 INJECTION INTRAVENOUS at 18:48

## 2021-12-02 RX ADMIN — PANTOPRAZOLE SODIUM 40 MILLIGRAM(S): 20 TABLET, DELAYED RELEASE ORAL at 05:38

## 2021-12-02 RX ADMIN — ENOXAPARIN SODIUM 40 MILLIGRAM(S): 100 INJECTION SUBCUTANEOUS at 12:51

## 2021-12-02 RX ADMIN — Medication 650 MILLIGRAM(S): at 09:30

## 2021-12-02 RX ADMIN — AMLODIPINE BESYLATE 5 MILLIGRAM(S): 2.5 TABLET ORAL at 05:39

## 2021-12-02 RX ADMIN — FAMOTIDINE 20 MILLIGRAM(S): 10 INJECTION INTRAVENOUS at 05:38

## 2021-12-02 RX ADMIN — Medication 1: at 12:50

## 2021-12-02 NOTE — DISCHARGE NOTE PROVIDER - HOSPITAL COURSE
67yo M with history of HTN and T2DM presents to hospital for evaluation of 5-6 month history of abdominal pain. Patient reported insidious onset of epigastric abdominal pain 5-6 months ago. At that time was treated with H2 blockers with some relief. Pain has worsened since then, 9/10. Exacerbated with movement and eating, improved when lying still. Associated with early satiety and unknown amount of weight loss. He was seen in [facility] recently for similar complaint. At that time underwent US, CT and MRI which revealed gallbladder mass, prescribed flagyl and he was told to come to a 'better hospital'. He reports subjective fevers, abdominal distension and night sweats.    Hospital course: 67yo M with history of HTN and T2DM presented to hospital for evaluation of 5-6 month history of abdominal pain. Patient reported insidious onset of epigastric abdominal pain 5-6 months ago. At that time he was treated with H2 blockers with some relief. Pain worsened since then and became 9/10. Exacerbated with movement and eating, improved when lying still. Associated with early satiety and unknown amount of weight loss. He was seen in [facility] recently for similar complaint. At that time, he underwent US, CT and MRI which revealed a gallbladder mass. He was prescribed flagyl and was told to come to a 'better hospital'. He reports subjective fevers, abdominal distension and night sweats.    Hospital course: In the hospital, PT received an abdominal CT and MRI, which revealed a gallbladder mass with local invasion into the liver and melisa hepatis lymphadenopathy. Endoscopic ultrasound with biopsy was done, revealing a likely malignant biliary/gallbladder tumor eroding into the duodenal bulb and causing moderate stenosis. Patient is ready to be discharged, pending biopsy results from pathology. On discharge, patient was stable and will be sent for appropriate follow-up. 65yo M with history of HTN and T2DM presented to hospital for evaluation of 5-6 month history of abdominal pain. Patient reported insidious onset of epigastric abdominal pain 5-6 months ago. At that time he was treated with H2 blockers with some relief. Pain worsened since then and became 9/10. Exacerbated with movement and eating, improved when lying still. Associated with early satiety and unknown amount of weight loss. He was seen in [facility] recently for similar complaint. At that time, he underwent US, CT and MRI which revealed a gallbladder mass. He was prescribed flagyl and was told to come to a 'better hospital'. He reports subjective fevers, abdominal distension and night sweats.    Hospital course: In the hospital, PT received an abdominal CT and MRI, which revealed a gallbladder mass with local invasion into the liver and melisa hepatis lymphadenopathy. Endoscopic ultrasound with biopsy was done, revealing moderately differentiated adenocarcinoma. Pt was seen by oncology to set up treatment. Patient is ready to be discharged. On discharge, patient was stable and will be sent for appropriate follow-up with oncology.

## 2021-12-02 NOTE — DISCHARGE NOTE PROVIDER - NSDCCPTREATMENT_GEN_ALL_CORE_FT
PRINCIPAL PROCEDURE  Procedure: MRI abdomen  Findings and Treatment:   EXAM:  MR ABDOMEN WAW IC    PROCEDURE DATE:  Nov 30 2021   INTERPRETATION:  CLINICAL INFORMATION: Gallbladder mass.  COMPARISON: Abdominal sonogram and CT abdomen pelvis dated 11/28/2021.  CONTRAST/COMPLICATIONS:  IV Contrast: Gadavist  7 cc administered   0.5 cc discarded  Oral Contrast: NONE  Complications: None reported at time of study completion  PROCEDURE:  MRI of the abdomen was performed.  MRCP was performed.  FINDINGS:  LOWER CHEST: Within normal limits.  LIVER: Normal size and contour.  BILE DUCTS: No intra or extrahepatic biliary ductal dilatation. Common bile duct measures 6 mm.  GALLBLADDER: Enhancing mass arising within the gallbladder (8, 11) measures 4.1 x 3.2 cm. Hyperenhancement and diffusion restriction in the adjacent liver suspicious for local extension of tumor.  SPLEEN: Within normal limits.  PANCREAS: Within normal limits.  ADRENALS: Within normal limits.  KIDNEYS/URETERS: Bilateral simple renal cortical cysts. No hydronephrosis.  VISUALIZEDPORTIONS:  BOWEL: Within normal limits.  PERITONEUM: No ascites.  VESSELS: Portal and hepatic veins are patent.  RETROPERITONEUM/LYMPH NODES: Right upper quadrant adenopathy. Reference sarah hepatis lymph node (8, 13) measures 2.6 x 1.4 cm.  ABDOMINAL WALL: Within normal limits.  BONES: Within normal limits.  IMPRESSION:  4 cm mass in the gallbladder fossa suspicious for primary gallbladder malignancy. Evidence of local invasion of the adjacent liver.  Sarah hepatis lymphadenopathy.        SECONDARY PROCEDURE  Procedure: Abdomen CT  Findings and Treatment: CT abdomen scan read:   IMPRESSION:  3.5 x 3.0 cm low-attenuation mass replacing the gallbladder and invading the adjacent liver parenchyma. There is also involvement of the adjacent common hepatic duct resulting in mild intrahepatic dilatation. There is also very close abutment with the duodenum without definite fistulization.  Portal lymphadenopathy.  A 2.7 cm indeterminate hypodensity in the left kidney.       PRINCIPAL PROCEDURE  Procedure: Surgical pathology procedure  Findings and Treatment: Surgical Pathology Report (12.01.21 @ 15:30)  Surgical Pathology Report:   ACCESSION No: [id number]   Patient: ANISA IRVING   Accession: [id number]   Collected Date/Time: 12/1/2021 15:30 EST   Received Date/Time: 12/1/2021 15:30 EST   Surgical Pathology Report - Auth (Verified)   Specimen(s) Submitted   1-Duodenal mass biopsy   Final Diagnosis   1. Duodenal mass biopsy:   Moderately differentiated adenocarcinoma   Comment: Case subject to intradepartmental review.   Verified by: CAMILA CASTILLO   (Electronic Signature)   Reported on: 12/02/21 16:32 EST, [facility], [address] [place] [address]   Phone: [phone] Fax: [phone]   _________________________________________________________________   Clinical Information   Gallbladder mass   Gross Description: Received in formalin labeled "duodenal mass biopsy" are multiple, tan-pink, soft tissue fragments ranging from 1 cm to 0.5 cm in greatest dimension. Entirely in one cassette: 1A.   Luciano HUFF ([facility]) 12/01/2021 04:01 PM   Disclaimer   In addition to other data that may appear on the specimen containers, all labels have been inspected to confirm the presence of the patient's name and date of birth.   Histological Processing Performed at [facility], Department of Pathology, [address] [place] [address].      SECONDARY PROCEDURE  Procedure: MRI abdomen  Findings and Treatment: FINDINGS:  LOWER CHEST: Within normal limits.  LIVER: Normal size and contour.  BILE DUCTS: No intra or extrahepatic biliary ductal dilatation. Common bile duct measures 6 mm.  GALLBLADDER: Enhancing mass arising within the gallbladder (8, 11) measures 4.1 x 3.2 cm. Hyperenhancement and diffusion restriction in the adjacent liver suspicious for local extension of tumor.  SPLEEN: Within normal limits.  PANCREAS: Within normal limits.  ADRENALS: Within normal limits.  KIDNEYS/URETERS: Bilateral simple renal cortical cysts. No hydronephrosis.  VISUALIZED PORTIONS:  BOWEL: Within normal limits.  PERITONEUM: No ascites.  VESSELS: Portal and hepatic veins are patent.  RETROPERITONEUM/LYMPH NODES: Right upper quadrant adenopathy. Reference melisa hepatis lymph node (8, 13) measures 2.6 x 1.4 cm.  ABDOMINAL WALL: Within normal limits.  BONES: Within normal limits.  IMPRESSION:  4 cm mass in the gallbladder fossa suspicious for primary gallbladder malignancy. Evidence of local invasion of the adjacent liver.  Melisa hepatis lymphadenopathy.      Procedure: Abdomen CT  Findings and Treatment: CT abdomen scan read:   IMPRESSION:  3.5 x 3.0 cm low-attenuation mass replacing the gallbladder and invading the adjacent liver parenchyma. There is also involvement of the adjacent common hepatic duct resulting in mild intrahepatic dilatation. There is also very close abutment with the duodenum without definite fistulization.  Portal lymphadenopathy.  A 2.7 cm indeterminate hypodensity in the left kidney.

## 2021-12-02 NOTE — CHART NOTE - NSCHARTNOTEFT_GEN_A_CORE
Surgical Pathology Report: )   1-Duodenal mass biopsy   Final Diagnosis   1. Duodenal mass biopsy:   Moderately differentiated adenocarcinoma     Recommend:  Medical oncology consult for further management Surgical Pathology Report: )   1-Duodenal mass biopsy   Final Diagnosis   1. Duodenal mass biopsy:   Moderately differentiated adenocarcinoma   Patient family was informed   Recommend Medical oncology consult for further management ( discussed with primary team)

## 2021-12-02 NOTE — DISCHARGE NOTE PROVIDER - NSFOLLOWUPCLINICS_GEN_ALL_ED_FT
[facility]  Hematology/Oncology  [address] [id number]  Phone: [phone]  Fax:   Follow Up Time: Routine

## 2021-12-02 NOTE — PROGRESS NOTE ADULT - ASSESSMENT
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 Biopsied.  # 3.5 x 3.0 cm mass replacing the gallbladder and invading the adjacent liver parenchyma causing mild intrahepatic dilatation.   # 2.8 x 1.5 cm portal lymph node and Portacaval lymph node measures 2.6 x 0.9 cm .    Recommendations:  - Await pathology results.  - Mechanical Soft diet.    Artur Acevedo MD  Gastroenterology Fellow  Pager: [phone]  Please call answering service [phone] / on-call GI fellow after 5pm and before 8am, and on weekends.

## 2021-12-02 NOTE — DISCHARGE NOTE PROVIDER - CARE PROVIDER_API CALL
Alon Davies)  Surgery  [address] [place] [address]  Phone: [phone]  Fax: [phone]  Follow Up Time: Routine

## 2021-12-02 NOTE — PROGRESS NOTE ADULT - SUBJECTIVE AND OBJECTIVE BOX
Anesthesia Post Operative Note    s/p day 1 of procedure: EGD    66y Male POSTOP DAY 1 S/P     Vital Signs Last 24 Hrs  T(C): 36.7 (02 Dec 2021 05:36), Max: 37.2 (01 Dec 2021 11:05)  T(F): 98.1 (02 Dec 2021 05:36), Max: 98.9 (01 Dec 2021 11:05)  HR: 86 (02 Dec 2021 05:36) (77 - 99)  BP: 126/87 (02 Dec 2021 05:36) (94/65 - 138/82)  BP(mean): --  RR: 17 (02 Dec 2021 05:36) (10 - 17)  SpO2: 98% (02 Dec 2021 05:36) (95% - 98%)    Patient seen at: 09:21    Doing well, no anesthetic complications or complaints noted or reported.  Pain is controlled.

## 2021-12-02 NOTE — DISCHARGE NOTE PROVIDER - NSDCCPCAREPLAN_GEN_ALL_CORE_FT
PRINCIPAL DISCHARGE DIAGNOSIS  Diagnosis: Gallbladder mass  Assessment and Plan of Treatment:        PRINCIPAL DISCHARGE DIAGNOSIS  Diagnosis: Gallbladder mass  Assessment and Plan of Treatment: You were admitted to the hospital because you had 5-6 months of pain on the left side of your stomach. We did a CAT and MRI scan to check for anything abnormal inside your body in the stomach area. These 2 scans showed that you have a gallbladder tumor that has likely also grown into the liver, which could possibly be cancer. We took a sample of the tissue (biopsy) in the gallbladder mass and we are still waiting for the results from pathology. You do not need to stay in the hospital anymore, and we would like you to follow-up with an outpatient doctor to obtain results of the biopsy and for further planning of management.       PRINCIPAL DISCHARGE DIAGNOSIS  Diagnosis: Gallbladder mass  Assessment and Plan of Treatment: You were admitted to the hospital because you had 5-6 months of pain on the left side of your stomach. We did a CAT and MRI scan to check for anything abnormal inside your body in the stomach area. These 2 scans showed that you have a gallbladder tumor that has likely also grown into the liver, which could possibly be cancer. We took a sample of the tissue (biopsy) in the gallbladder mass and it showed moderately diffused adenocarcinoma. You had a meeting with the oncology team regarding future care. They will reach out to you by email to set up an appointnemt.

## 2021-12-02 NOTE — PROGRESS NOTE ADULT - PROBLEM SELECTOR PLAN 4
DVT ppx: SCDs with planning for possible bx  Diet:  Halal  Dispo: Pending clinical course - DVT ppx: SCDs with planning for possible bx  - Diet:  Halal  - Dispo: Home with outpatient follow-up for further planning of management

## 2021-12-02 NOTE — PROGRESS NOTE ADULT - SUBJECTIVE AND OBJECTIVE BOX
Interval Events:   s/p EGD with biopsy   No abdominal pain  No nausea /vomiting     Hospital Medications:  acetaminophen     Tablet .. 650 milliGRAM(s) Oral every 6 hours PRN  aluminum hydroxide/magnesium hydroxide/simethicone Suspension 30 milliLiter(s) Oral every 4 hours PRN  amLODIPine   Tablet 5 milliGRAM(s) Oral daily  dextrose 40% Gel 15 Gram(s) Oral once  dextrose 5%. 1000 milliLiter(s) IV Continuous <Continuous>  dextrose 5%. 1000 milliLiter(s) IV Continuous <Continuous>  dextrose 50% Injectable 25 Gram(s) IV Push once  dextrose 50% Injectable 12.5 Gram(s) IV Push once  dextrose 50% Injectable 25 Gram(s) IV Push once  enoxaparin Injectable 40 milliGRAM(s) SubCutaneous daily  famotidine    Tablet 20 milliGRAM(s) Oral two times a day  glucagon  Injectable 1 milliGRAM(s) IntraMuscular once  insulin lispro (ADMELOG) corrective regimen sliding scale   SubCutaneous at bedtime  insulin lispro (ADMELOG) corrective regimen sliding scale   SubCutaneous three times a day before meals  lactated ringers. 1000 milliLiter(s) IV Continuous <Continuous>  oxyCODONE    IR 2.5 milliGRAM(s) Oral every 6 hours PRN  oxyCODONE    IR 5 milliGRAM(s) Oral every 8 hours PRN  pantoprazole    Tablet 40 milliGRAM(s) Oral before breakfast        ROS:   General:  No fevers, chills or night sweats  ENT:  No sore throat or dysphagia  CV:  No pain or palpitations  Resp:  No dyspnea, cough or  wheezing  GI:  as above  Skin:  No rash or edema  Neuro: no weakness   Hematologic: no bleeding  Musculoskeletal: no muscle pain or join pain  Psych: no agitation      PHYSICAL EXAM:   Vital Signs:  Vital Signs Last 24 Hrs  T(C): 36.7 (02 Dec 2021 05:36), Max: 37.2 (01 Dec 2021 11:05)  T(F): 98.1 (02 Dec 2021 05:36), Max: 98.9 (01 Dec 2021 11:05)  HR: 86 (02 Dec 2021 05:36) (77 - 99)  BP: 126/87 (02 Dec 2021 05:36) (94/65 - 138/82)  BP(mean): --  RR: 17 (02 Dec 2021 05:36) (10 - 17)  SpO2: 98% (02 Dec 2021 05:36) (95% - 98%)  Daily Height in cm: 170.2 (01 Dec 2021 11:20)    Daily     GENERAL:  NAD, Appears stated age  HEENT:  NC/AT,  conjunctivae clear and pink, sclera -anicteric  CHEST:  Normal Effort, Breath sounds clear  HEART:  RRR, S1 + S2, no murmurs  ABDOMEN:  Soft, non-tender, non-distended, normoactive bowel sounds,  no masses  EXTREMITIES:  no cyanosis or edema  SKIN:  Warm & Dry. No rash or erythema  NEURO:  Alert, oriented, no focal deficit    LABS:                        14.3   8.61  )-----------( 256      ( 02 Dec 2021 06:04 )             43.6     Mean Cell Volume: 94.0 fL (12-02-21 @ 06:04)    12-02    139  |  104  |  9   ----------------------------<  111<H>  3.8   |  24  |  0.92    Ca    9.4      02 Dec 2021 06:04  Phos  3.8     12-02  Mg     2.10     12-02    TPro  7.2  /  Alb  3.7  /  TBili  <0.2  /  DBili  x   /  AST  19  /  ALT  26  /  AlkPhos  63  12-01    LIVER FUNCTIONS - ( 01 Dec 2021 04:38 )  Alb: 3.7 g/dL / Pro: 7.2 g/dL / ALK PHOS: 63 U/L / ALT: 26 U/L / AST: 19 U/L / GGT: x           PT/INR - ( 01 Dec 2021 04:38 )   PT: 12.5 sec;   INR: 1.09 ratio         PTT - ( 01 Dec 2021 04:38 )  PTT:30.2 sec                            14.3   8.61  )-----------( 256      ( 02 Dec 2021 06:04 )             43.6                         14.1   8.55  )-----------( 248      ( 01 Dec 2021 04:38 )             41.3                         15.5   8.92  )-----------( 281      ( 30 Nov 2021 07:28 )             46.3       Imaging:  < from: Upper Endoscopy (12.01.21 @ 11:13) >  Findings:       The examined esophagus was normal.       No gross lesions were noted in the entire examined stomach.       A large fungating and ulcerated mass with no bleeding was found in the        duodenal bulb. Biopsies were taken with a cold forceps for histology.        The mass is causing moderate stenosis, which was traversed.       An moderate stenosis was found in the duodenal bulb and was traversed.       The exam of the duodenum was otherwise normal.                            Impression:          - Likely malignant biliary / gall bladder tumor eroding                        into duodenal bulb and causing mild to moderate                        stenosis. Biopsied.                       - EUS was not performed. Due to above findings.  Recommendation:      - Await pathology results.                       - Soft diet.    < end of copied text >

## 2021-12-02 NOTE — DISCHARGE NOTE PROVIDER - NSDCMRMEDTOKEN_GEN_ALL_CORE_FT
amLODIPine 5 mg oral tablet: 1 tab(s) orally once a day  famotidine 20 mg oral tablet: 1 tab(s) orally 2 times a day  metFORMIN 500 mg oral tablet: 1 tab(s) orally 2 times a day   acetaminophen 325 mg oral tablet: 2 tab(s) orally every 6 hours, As needed, Mild to Moderate Pain (1 - 6)  amLODIPine 5 mg oral tablet: 1 tab(s) orally once a day  famotidine 20 mg oral tablet: 1 tab(s) orally 2 times a day  metFORMIN 500 mg oral tablet: 1 tab(s) orally 2 times a day  oxyCODONE 5 mg oral tablet: 0.5 tab(s) orally every 8 hours, As Needed -Severe Pain (7 - 10) MDD:MDD: 1.5 tablets, 7.5mg oxycodone

## 2021-12-02 NOTE — PROGRESS NOTE ADULT - SUBJECTIVE AND OBJECTIVE BOX
Maribeth Rojas MD  Internal Medicine, PGY1  Universal pager: [phone] / [facility]: [phone]      Patient is a 66y old  Male who presents with a chief complaint of Abdominal pain (28 Nov 2021 21:11)      SUBJECTIVE: Pt complains of abdominal pressure this morning as previous. Denies F/C, lightheadedness, HA, CP, SOB, N/V/C/D, burning w/ urination, leg swelling. Says urine is dark in color.     focused ROS as above    MEDICATIONS  (STANDING):  amLODIPine   Tablet 5 milliGRAM(s) Oral daily  dextrose 40% Gel 15 Gram(s) Oral once  dextrose 5%. 1000 milliLiter(s) (50 mL/Hr) IV Continuous <Continuous>  dextrose 5%. 1000 milliLiter(s) (100 mL/Hr) IV Continuous <Continuous>  dextrose 50% Injectable 25 Gram(s) IV Push once  dextrose 50% Injectable 12.5 Gram(s) IV Push once  dextrose 50% Injectable 25 Gram(s) IV Push once  famotidine    Tablet 20 milliGRAM(s) Oral two times a day  glucagon  Injectable 1 milliGRAM(s) IntraMuscular once  insulin lispro (ADMELOG) corrective regimen sliding scale   SubCutaneous every 6 hours  lactated ringers. 1000 milliLiter(s) (50 mL/Hr) IV Continuous <Continuous>    MEDICATIONS  (PRN):  acetaminophen     Tablet .. 650 milliGRAM(s) Oral every 6 hours PRN Mild Pain (1 - 3)  aluminum hydroxide/magnesium hydroxide/simethicone Suspension 30 milliLiter(s) Oral every 4 hours PRN Dyspepsia  oxyCODONE    IR 2.5 milliGRAM(s) Oral every 6 hours PRN Moderate Pain (4 - 6)  oxyCODONE    IR 5 milliGRAM(s) Oral every 8 hours PRN Severe Pain (7 - 10)      Vital Signs Last 24 Hrs  T(C): 36.7 (30 Nov 2021 13:06), Max: 36.8 (29 Nov 2021 20:06)  T(F): 98.1 (30 Nov 2021 13:06), Max: 98.3 (29 Nov 2021 20:06)  HR: 114 (30 Nov 2021 13:06) (85 - 114)  BP: 127/90 (30 Nov 2021 13:06) (124/85 - 143/84)  BP(mean): --  RR: 18 (30 Nov 2021 13:06) (18 - 18)  SpO2: 97% (30 Nov 2021 13:06) (96% - 100%)    PHYSICAL EXAM  GENERAL: NAD   HEAD:  Atraumatic, normocephalic  EYES: EOMI, sclera clear  CHEST/LUNG: Clear to auscultation bilaterally; no wheeze  HEART: RRR; S1, S2; no M/R/G  ABDOMEN: mildly distended abdomen with tenderness to palpation in LUQ and LLQ; no masses. BS present  EXTREMITIES:  2+ Peripheral Pulses; no edema  NEURO: non-focal, AAOx3  PSYCH: appropriate affect  SKIN: No rashes or lesions    LABS:  CAPILLARY BLOOD GLUCOSE      POCT Blood Glucose.: 90 mg/dL (29 Nov 2021 04:23)  POCT Blood Glucose.: 95 mg/dL (28 Nov 2021 22:25)  POCT Blood Glucose.: 126 mg/dL (28 Nov 2021 14:00)    I&O's Summary    28 Nov 2021 07:01  -  29 Nov 2021 07:00  --------------------------------------------------------  IN: 0 mL / OUT: 400 mL / NET: -400 mL                            14.6   8.52  )-----------( 247      ( 29 Nov 2021 06:56 )             42.4     11-28    137  |  103  |  15  ----------------------------<  108<H>  4.3   |  24  |  1.01    Ca    9.3      28 Nov 2021 16:37    TPro  7.2  /  Alb  3.7  /  TBili  0.2  /  DBili  x   /  AST  20  /  ALT  39  /  AlkPhos  60  11-28    PT/INR - ( 29 Nov 2021 06:56 )   PT: 13.8 sec;   INR: 1.21 ratio         PTT - ( 29 Nov 2021 06:56 )  PTT:31.3 sec          Microbiology:      RADIOLOGY & ADDITIONAL TESTS:    Imaging Personally Reviewed:  Consultant(s) Notes Reviewed:    Care Discussed with Consultants/Other Providers: Maribeth Rojas MD  Internal Medicine, PGY1  Universal pager: [phone] / [facility]: [phone]      Patient is a 66y old male who presents with a chief complaint of abdominal pain (28 Nov 2021 21:11)      SUBJECTIVE: Pt complains of left-sided abdominal pain (rated 5/10) this morning, which is worse with moving, lying on the sides, and sitting upright. Similar to previous. Denies F/C, lightheadedness, HA, CP, SOB, N/V/C/D, burning w/ urination, leg swelling. Urination and bowel movements normal.     Focused ROS as above    MEDICATIONS  (STANDING):  amLODIPine   Tablet 5 milliGRAM(s) Oral daily  dextrose 40% Gel 15 Gram(s) Oral once  dextrose 5%. 1000 milliLiter(s) (50 mL/Hr) IV Continuous <Continuous>  dextrose 5%. 1000 milliLiter(s) (100 mL/Hr) IV Continuous <Continuous>  dextrose 50% Injectable 25 Gram(s) IV Push once  dextrose 50% Injectable 12.5 Gram(s) IV Push once  dextrose 50% Injectable 25 Gram(s) IV Push once  enoxaparin Injectable 40 milliGRAM(s) SubCutaneous daily  famotidine    Tablet 20 milliGRAM(s) Oral two times a day  glucagon  Injectable 1 milliGRAM(s) IntraMuscular once  insulin lispro (ADMELOG) corrective regimen sliding scale   SubCutaneous at bedtime  insulin lispro (ADMELOG) corrective regimen sliding scale   SubCutaneous three times a day before meals  lactated ringers. 1000 milliLiter(s) (50 mL/Hr) IV Continuous <Continuous>  pantoprazole    Tablet 40 milliGRAM(s) Oral before breakfast    MEDICATIONS  (PRN):  acetaminophen     Tablet .. 650 milliGRAM(s) Oral every 6 hours PRN Mild Pain (1 - 3)  aluminum hydroxide/magnesium hydroxide/simethicone Suspension 30 milliLiter(s) Oral every 4 hours PRN Dyspepsia  oxyCODONE    IR 2.5 milliGRAM(s) Oral every 6 hours PRN Moderate Pain (4 - 6)  oxyCODONE    IR 5 milliGRAM(s) Oral every 8 hours PRN Severe Pain (7 - 10)    ICU Vital Signs Last 24 Hrs  T(C): 36.6 (02 Dec 2021 13:06), Max: 36.7 (02 Dec 2021 05:36)  T(F): 97.9 (02 Dec 2021 13:06), Max: 98.1 (02 Dec 2021 05:36)  HR: 88 (02 Dec 2021 13:06) (82 - 88)  BP: 100/72 (02 Dec 2021 13:06) (100/72 - 138/82)  BP(mean): --  RR: 17 (02 Dec 2021 13:06) (17 - 17)  SpO2: 98% (02 Dec 2021 13:06) (98% - 98%)    PHYSICAL EXAM  GENERAL: NAD   HEAD:  Atraumatic, normocephalic  EYES: EOMI, sclera clear  CHEST/LUNG: Clear to auscultation bilaterally; no wheeze  HEART: RRR; S1, S2; no M/R/G  ABDOMEN: mildly distended abdomen with no TTP, rebound, guarding, or masses. BS present in all 4 quadrants  EXTREMITIES: 2+ Peripheral Pulses; no edema  NEURO: non-focal, AAOx3  PSYCH: appropriate affect  SKIN: No rashes or lesions    LABS:    CAPILLARY BLOOD GLUCOSE  POCT Blood Glucose.: 131 mg/dL (02 Dec 2021 17:26)  POCT Blood Glucose.: 163 mg/dL (02 Dec 2021 12:08)  POCT Blood Glucose.: 119 mg/dL (02 Dec 2021 08:48)  POCT Blood Glucose.: 93 mg/dL (01 Dec 2021 21:48)    I&O's Summary    28 Nov 2021 07:01  -  29 Nov 2021 07:00  --------------------------------------------------------  IN: 0 mL / OUT: 400 mL / NET: -400 mL                      14.3   8.61  )-----------( 256      ( 02 Dec 2021 06:04 )             43.6     12-02    139  |  104  |  9   ----------------------------<  111<H>  3.8   |  24  |  0.92    Ca    9.4      02 Dec 2021 06:04  Phos  3.8     12-02  Mg     2.10     12-02    TPro  7.1  /  Alb  3.8  /  TBili  0.3  /  DBili  <0.2  /  AST  24  /  ALT  26  /  AlkPhos  57  12-02    PT/INR - ( 01 Dec 2021 04:38 )   PT: 12.5 sec;   INR: 1.09 ratio       PTT - ( 01 Dec 2021 04:38 )  PTT:30.2 sec   Maribeth Rojas MD  Internal Medicine, PGY1  Universal pager: [phone] / [facility]: [phone]      Patient is a 66y old male who presents with a chief complaint of abdominal pain (28 Nov 2021 21:11)      SUBJECTIVE: Pt complains of left-sided abdominal pain (rated 5/10) this morning, which is worse with moving, lying on the sides, and sitting upright. Similar to previous. Patient tolerated dinner well last night. Denies F/C, lightheadedness, HA, CP, SOB, N/V/C/D, burning w/ urination, leg swelling. Urination and bowel movements normal.     Focused ROS as above    MEDICATIONS  (STANDING):  amLODIPine   Tablet 5 milliGRAM(s) Oral daily  dextrose 40% Gel 15 Gram(s) Oral once  dextrose 5%. 1000 milliLiter(s) (50 mL/Hr) IV Continuous <Continuous>  dextrose 5%. 1000 milliLiter(s) (100 mL/Hr) IV Continuous <Continuous>  dextrose 50% Injectable 25 Gram(s) IV Push once  dextrose 50% Injectable 12.5 Gram(s) IV Push once  dextrose 50% Injectable 25 Gram(s) IV Push once  enoxaparin Injectable 40 milliGRAM(s) SubCutaneous daily  famotidine    Tablet 20 milliGRAM(s) Oral two times a day  glucagon  Injectable 1 milliGRAM(s) IntraMuscular once  insulin lispro (ADMELOG) corrective regimen sliding scale   SubCutaneous at bedtime  insulin lispro (ADMELOG) corrective regimen sliding scale   SubCutaneous three times a day before meals  lactated ringers. 1000 milliLiter(s) (50 mL/Hr) IV Continuous <Continuous>  pantoprazole    Tablet 40 milliGRAM(s) Oral before breakfast    MEDICATIONS  (PRN):  acetaminophen     Tablet .. 650 milliGRAM(s) Oral every 6 hours PRN Mild Pain (1 - 3)  aluminum hydroxide/magnesium hydroxide/simethicone Suspension 30 milliLiter(s) Oral every 4 hours PRN Dyspepsia  oxyCODONE    IR 2.5 milliGRAM(s) Oral every 6 hours PRN Moderate Pain (4 - 6)  oxyCODONE    IR 5 milliGRAM(s) Oral every 8 hours PRN Severe Pain (7 - 10)    ICU Vital Signs Last 24 Hrs  T(C): 36.6 (02 Dec 2021 13:06), Max: 36.7 (02 Dec 2021 05:36)  T(F): 97.9 (02 Dec 2021 13:06), Max: 98.1 (02 Dec 2021 05:36)  HR: 88 (02 Dec 2021 13:06) (82 - 88)  BP: 100/72 (02 Dec 2021 13:06) (100/72 - 138/82)  BP(mean): --  RR: 17 (02 Dec 2021 13:06) (17 - 17)  SpO2: 98% (02 Dec 2021 13:06) (98% - 98%)    PHYSICAL EXAM  GENERAL: NAD   HEAD:  Atraumatic, normocephalic  EYES: EOMI, sclera clear  CHEST/LUNG: Clear to auscultation bilaterally; no wheeze  HEART: RRR; S1, S2; no M/R/G  ABDOMEN: mildly distended abdomen with no TTP, rebound, guarding, or masses. BS present in all 4 quadrants  EXTREMITIES: 2+ Peripheral Pulses; no edema  NEURO: non-focal, AAOx3  PSYCH: appropriate affect  SKIN: No rashes or lesions    LABS:    CAPILLARY BLOOD GLUCOSE  POCT Blood Glucose.: 131 mg/dL (02 Dec 2021 17:26)  POCT Blood Glucose.: 163 mg/dL (02 Dec 2021 12:08)  POCT Blood Glucose.: 119 mg/dL (02 Dec 2021 08:48)  POCT Blood Glucose.: 93 mg/dL (01 Dec 2021 21:48)    I&O's Summary    28 Nov 2021 07:01  -  29 Nov 2021 07:00  --------------------------------------------------------  IN: 0 mL / OUT: 400 mL / NET: -400 mL                      14.3   8.61  )-----------( 256      ( 02 Dec 2021 06:04 )             43.6     12-02    139  |  104  |  9   ----------------------------<  111<H>  3.8   |  24  |  0.92    Ca    9.4      02 Dec 2021 06:04  Phos  3.8     12-02  Mg     2.10     12-02    TPro  7.1  /  Alb  3.8  /  TBili  0.3  /  DBili  <0.2  /  AST  24  /  ALT  26  /  AlkPhos  57  12-02    PT/INR - ( 01 Dec 2021 04:38 )   PT: 12.5 sec;   INR: 1.09 ratio       PTT - ( 01 Dec 2021 04:38 )  PTT: 30.2 sec

## 2021-12-02 NOTE — PROGRESS NOTE ADULT - SUBJECTIVE AND OBJECTIVE BOX
24h Events:   - Overnight, no acute events.  Had EGD yesterday which was significant for fungating and ulcerated mass protruding into duodenum from gallbladder.  MRCP significant for 4cm gallbladder mass involving liver as well as portal LAD.     Subjective:   Patient examined at bedside this AM. Reports feeling well.  Passing gas and having BM.  Tolerated some liquids.  Deneis N/V.      Objective:  Vital Signs  T(C): 36.7 (12-02 @ 05:36), Max: 37.2 (12-01 @ 11:05)  HR: 86 (12-02 @ 05:36) (77 - 99)  BP: 126/87 (12-02 @ 05:36) (94/65 - 138/82)  RR: 17 (12-02 @ 05:36) (10 - 17)  SpO2: 98% (12-02 @ 05:36) (95% - 98%)    Physical Exam:  GEN: resting in bed comfortably in NAD  RESP: no increased WOB  ABD: soft, non-distended, non-tender without rebound tenderness or guarding  NEURO: AAOx4    Labs:                        14.3   8.61  )-----------( 256      ( 02 Dec 2021 06:04 )             43.6   12-02    139  |  104  |  9   ----------------------------<  111<H>  3.8   |  24  |  0.92    Ca    9.4      02 Dec 2021 06:04  Phos  3.8     12-02  Mg     2.10     12-02    TPro  7.2  /  Alb  3.7  /  TBili  <0.2  /  DBili  x   /  AST  19  /  ALT  26  /  AlkPhos  63  12-01    CAPILLARY BLOOD GLUCOSE      POCT Blood Glucose.: 93 mg/dL (01 Dec 2021 21:48)  POCT Blood Glucose.: 229 mg/dL (01 Dec 2021 17:40)  POCT Blood Glucose.: 112 mg/dL (01 Dec 2021 15:02)  POCT Blood Glucose.: 133 mg/dL (01 Dec 2021 13:56)  POCT Blood Glucose.: 118 mg/dL (01 Dec 2021 11:26)  POCT Blood Glucose.: 118 mg/dL (01 Dec 2021 10:40)      Medications:   MEDICATIONS  (STANDING):  amLODIPine   Tablet 5 milliGRAM(s) Oral daily  dextrose 40% Gel 15 Gram(s) Oral once  dextrose 5%. 1000 milliLiter(s) (50 mL/Hr) IV Continuous <Continuous>  dextrose 5%. 1000 milliLiter(s) (100 mL/Hr) IV Continuous <Continuous>  dextrose 50% Injectable 25 Gram(s) IV Push once  dextrose 50% Injectable 12.5 Gram(s) IV Push once  dextrose 50% Injectable 25 Gram(s) IV Push once  enoxaparin Injectable 40 milliGRAM(s) SubCutaneous daily  famotidine    Tablet 20 milliGRAM(s) Oral two times a day  glucagon  Injectable 1 milliGRAM(s) IntraMuscular once  insulin lispro (ADMELOG) corrective regimen sliding scale   SubCutaneous at bedtime  insulin lispro (ADMELOG) corrective regimen sliding scale   SubCutaneous three times a day before meals  lactated ringers. 1000 milliLiter(s) (50 mL/Hr) IV Continuous <Continuous>  pantoprazole    Tablet 40 milliGRAM(s) Oral before breakfast    MEDICATIONS  (PRN):  acetaminophen     Tablet .. 650 milliGRAM(s) Oral every 6 hours PRN Mild Pain (1 - 3)  aluminum hydroxide/magnesium hydroxide/simethicone Suspension 30 milliLiter(s) Oral every 4 hours PRN Dyspepsia  oxyCODONE    IR 2.5 milliGRAM(s) Oral every 6 hours PRN Moderate Pain (4 - 6)  oxyCODONE    IR 5 milliGRAM(s) Oral every 8 hours PRN Severe Pain (7 - 10)      Assessment:   66M w/ hx of HTN, DM, GERD, presenting with abdominal pain for 5-6 months and imaging with 3.5cm gallbladder mass.    - MRCP concerning for liver involvement as well as portal LAD  - CT chest noted without signs of pulm mets  - Tumor markers elevated  - EUS/EGD concerning for duodenal involvment  - F/U bx results   - Recommend medical oncology for chemo as patient is highly unlikely to be a surgical candidate at this time  - Discussed with attending, Dr. Davies  - Will follow for bx results    Surgical Oncology  Pager [phone]

## 2021-12-02 NOTE — PROGRESS NOTE ADULT - PROBLEM SELECTOR PLAN 1
Found to have GB mass with hepatic invasion. Mild intrahepatic duct dilation. Currently no signs of obstructive jaundice. High concern for malignancy vs less likely chronic cholecystitis.  - Chest CT w/o evidence of disease in thorax  -, CEA, AFP elevated  -Tylenol q6h PRN mild pain  -Oxy 2.5mg/5mg mod/severe pain, opiate naive  -C/w pepcid BID  -C/w maalox PRN  -DVT ppx, mechanical for now in lieu of possible bx  - MRI with 4 cm mass in the gallbladder fossa suspicious for primary gallbladder malignancy. Evidence of local invasion of the adjacent liver with melisa hepatis lymphadenopathy.  - EUS FNA biopsy today Found to have GB mass with hepatic invasion. Mild intrahepatic duct dilation. Currently no signs of obstructive jaundice. High concern for malignancy vs less likely chronic cholecystitis.  - Chest CT w/o evidence of disease in thorax  - , CEA, AFP elevated  - Tylenol q6h PRN mild pain  - Oxy 2.5mg/5mg mod/severe pain, opiate naive  - C/w pepcid BID  - C/w maalox PRN  - DVT ppx, mechanical for now in lieu of possible bx  - MRI with 4 cm mass in the gallbladder fossa suspicious for primary gallbladder malignancy. Evidence of local invasion of the adjacent liver with melisa hepatis lymphadenopathy.  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

## 2021-12-02 NOTE — DISCHARGE NOTE PROVIDER - NSDCFUADDAPPT_GEN_ALL_CORE_FT
[facility] will reach out to you by email to set up an appointment. Should you not receive any communication, feel free to call them at [phone].     After initiation of cancer treatment, please make an appointment with Surgical Oncologist Dr. Davies for further consultation regarding surgery.

## 2021-12-02 NOTE — PHYSICAL THERAPY INITIAL EVALUATION ADULT - ADDITIONAL COMMENTS
Pt reports that he lives in an apartment with his wife, daughter, son in law, and grandchildren with an elevator inside. Prior to hospital admission pt was completely independent and used no assistive device with ambulation. Pt denies any recent falls.    Pt left comfortable seated @ edge of bed, NAD, all lines intact, all precautions maintained, with call bell in reach, and RN aware.

## 2021-12-02 NOTE — PROGRESS NOTE ADULT - ASSESSMENT
67yo M with history of HTN and T2DM presents to hospital for evaluation of 5-6 month history of abdominal pain found to have gallbladder mass with hepatic invasion and early satiety concerning for malignant process. 65yo M with history of HTN and T2DM presents to hospital for evaluation of 5-6 month history of abdominal pain found to have gallbladder mass with hepatic invasion and early satiety concerning for malignant process. 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

## 2021-12-02 NOTE — PHYSICAL THERAPY INITIAL EVALUATION ADULT - PATIENT PROFILE REVIEW, REHAB EVAL
No Formal Activity Order in the Computer; Spoke with MICHAEL Knapp prior to PT evaluation--> Pt OK for PT consult/OOB activity./yes

## 2021-12-02 NOTE — PROGRESS NOTE ADULT - PROBLEM SELECTOR PLAN 2
Hx of HTN, well controlled on norvasc 5.  -C/w amlodipine 5mg qd Hx of HTN, well controlled on norvasc 5.  - C/w amlodipine 5mg qd

## 2021-12-03 VITALS
TEMPERATURE: 98 F | RESPIRATION RATE: 18 BRPM | OXYGEN SATURATION: 96 % | HEART RATE: 91 BPM | DIASTOLIC BLOOD PRESSURE: 89 MMHG | SYSTOLIC BLOOD PRESSURE: 132 MMHG

## 2021-12-03 LAB
GLUCOSE BLDC GLUCOMTR-MCNC: 195 MG/DL — HIGH (ref 70–99)
GLUCOSE BLDC GLUCOMTR-MCNC: 88 MG/DL — SIGNIFICANT CHANGE UP (ref 70–99)

## 2021-12-03 PROCEDURE — 99223 1ST HOSP IP/OBS HIGH 75: CPT

## 2021-12-03 PROCEDURE — 99232 SBSQ HOSP IP/OBS MODERATE 35: CPT

## 2021-12-03 PROCEDURE — 99239 HOSP IP/OBS DSCHRG MGMT >30: CPT

## 2021-12-03 RX ORDER — OXYCODONE HYDROCHLORIDE 5 MG/1
0.5 TABLET ORAL
Qty: 3 | Refills: 0
Start: 2021-12-03 | End: 2021-12-04

## 2021-12-03 RX ORDER — ACETAMINOPHEN 500 MG
2 TABLET ORAL
Qty: 0 | Refills: 0 | DISCHARGE
Start: 2021-12-03

## 2021-12-03 RX ADMIN — OXYCODONE HYDROCHLORIDE 5 MILLIGRAM(S): 5 TABLET ORAL at 09:23

## 2021-12-03 RX ADMIN — PANTOPRAZOLE SODIUM 40 MILLIGRAM(S): 20 TABLET, DELAYED RELEASE ORAL at 05:02

## 2021-12-03 RX ADMIN — ENOXAPARIN SODIUM 40 MILLIGRAM(S): 100 INJECTION SUBCUTANEOUS at 13:06

## 2021-12-03 RX ADMIN — OXYCODONE HYDROCHLORIDE 5 MILLIGRAM(S): 5 TABLET ORAL at 10:15

## 2021-12-03 RX ADMIN — AMLODIPINE BESYLATE 5 MILLIGRAM(S): 2.5 TABLET ORAL at 05:03

## 2021-12-03 RX ADMIN — Medication 1: at 09:17

## 2021-12-03 RX ADMIN — FAMOTIDINE 20 MILLIGRAM(S): 10 INJECTION INTRAVENOUS at 05:03

## 2021-12-03 NOTE — PROGRESS NOTE ADULT - PROBLEM SELECTOR PLAN 1
Found to have GB mass with hepatic invasion. Mild intrahepatic duct dilation. Currently no signs of obstructive jaundice. High concern for malignancy vs less likely chronic cholecystitis.  - Chest CT w/o evidence of disease in thorax  - , CEA, AFP elevated  - Tylenol q6h PRN mild pain  - Oxy 2.5mg/5mg mod/severe pain, opiate naive  - C/w pepcid BID  - C/w maalox PRN  - DVT ppx, mechanical for now in lieu of possible bx  - MRI with 4 cm mass in the gallbladder fossa suspicious for primary gallbladder malignancy. Evidence of local invasion of the adjacent liver with melisa hepatis lymphadenopathy.  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

## 2021-12-03 NOTE — PROGRESS NOTE ADULT - PROBLEM SELECTOR PLAN 3
Hx of T2DM on metformin 500mg BID.  - A1c 7  - Lipid panel with low HDL at 33 with total cholesterol WNL  - ISS, FSq6 while NPO

## 2021-12-03 NOTE — PROGRESS NOTE ADULT - ASSESSMENT
67yo M with history of HTN and T2DM presents to hospital for evaluation of 5-6 month history of abdominal pain found to have gallbladder mass with hepatic invasion and early satiety concerning for malignant process. 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

## 2021-12-03 NOTE — PROGRESS NOTE ADULT - SUBJECTIVE AND OBJECTIVE BOX
Maribeth Rojas MD  Internal Medicine, PGY1  Universal pager: [phone] / [facility]: [phone]      Patient is a 66y old male who presents with a chief complaint of abdominal pain (28 Nov 2021 21:11)      SUBJECTIVE: Pt complains of left-sided abdominal pain (rated 5/10) this morning, which is worse with moving, lying on the sides, and sitting upright. Similar to previous. Patient tolerated dinner well last night. Denies F/C, lightheadedness, HA, CP, SOB, N/V/C/D, burning w/ urination, leg swelling. Urination and bowel movements normal.     Focused ROS as above    MEDICATIONS  (STANDING):  amLODIPine   Tablet 5 milliGRAM(s) Oral daily  dextrose 40% Gel 15 Gram(s) Oral once  dextrose 5%. 1000 milliLiter(s) (50 mL/Hr) IV Continuous <Continuous>  dextrose 5%. 1000 milliLiter(s) (100 mL/Hr) IV Continuous <Continuous>  dextrose 50% Injectable 25 Gram(s) IV Push once  dextrose 50% Injectable 12.5 Gram(s) IV Push once  dextrose 50% Injectable 25 Gram(s) IV Push once  enoxaparin Injectable 40 milliGRAM(s) SubCutaneous daily  famotidine    Tablet 20 milliGRAM(s) Oral two times a day  glucagon  Injectable 1 milliGRAM(s) IntraMuscular once  insulin lispro (ADMELOG) corrective regimen sliding scale   SubCutaneous at bedtime  insulin lispro (ADMELOG) corrective regimen sliding scale   SubCutaneous three times a day before meals  lactated ringers. 1000 milliLiter(s) (50 mL/Hr) IV Continuous <Continuous>  pantoprazole    Tablet 40 milliGRAM(s) Oral before breakfast    MEDICATIONS  (PRN):  acetaminophen     Tablet .. 650 milliGRAM(s) Oral every 6 hours PRN Mild Pain (1 - 3)  aluminum hydroxide/magnesium hydroxide/simethicone Suspension 30 milliLiter(s) Oral every 4 hours PRN Dyspepsia  oxyCODONE    IR 2.5 milliGRAM(s) Oral every 6 hours PRN Moderate Pain (4 - 6)  oxyCODONE    IR 5 milliGRAM(s) Oral every 8 hours PRN Severe Pain (7 - 10)    ICU Vital Signs Last 24 Hrs  T(C): 36.6 (02 Dec 2021 13:06), Max: 36.7 (02 Dec 2021 05:36)  T(F): 97.9 (02 Dec 2021 13:06), Max: 98.1 (02 Dec 2021 05:36)  HR: 88 (02 Dec 2021 13:06) (82 - 88)  BP: 100/72 (02 Dec 2021 13:06) (100/72 - 138/82)  BP(mean): --  RR: 17 (02 Dec 2021 13:06) (17 - 17)  SpO2: 98% (02 Dec 2021 13:06) (98% - 98%)    PHYSICAL EXAM  GENERAL: NAD   HEAD:  Atraumatic, normocephalic  EYES: EOMI, sclera clear  CHEST/LUNG: Clear to auscultation bilaterally; no wheeze  HEART: RRR; S1, S2; no M/R/G  ABDOMEN: mildly distended abdomen with no TTP, rebound, guarding, or masses. BS present in all 4 quadrants  EXTREMITIES: 2+ Peripheral Pulses; no edema  NEURO: non-focal, AAOx3  PSYCH: appropriate affect  SKIN: No rashes or lesions    LABS:    CAPILLARY BLOOD GLUCOSE  POCT Blood Glucose.: 131 mg/dL (02 Dec 2021 17:26)  POCT Blood Glucose.: 163 mg/dL (02 Dec 2021 12:08)  POCT Blood Glucose.: 119 mg/dL (02 Dec 2021 08:48)  POCT Blood Glucose.: 93 mg/dL (01 Dec 2021 21:48)    I&O's Summary    28 Nov 2021 07:01  -  29 Nov 2021 07:00  --------------------------------------------------------  IN: 0 mL / OUT: 400 mL / NET: -400 mL                      14.3   8.61  )-----------( 256      ( 02 Dec 2021 06:04 )             43.6     12-02    139  |  104  |  9   ----------------------------<  111<H>  3.8   |  24  |  0.92    Ca    9.4      02 Dec 2021 06:04  Phos  3.8     12-02  Mg     2.10     12-02    TPro  7.1  /  Alb  3.8  /  TBili  0.3  /  DBili  <0.2  /  AST  24  /  ALT  26  /  AlkPhos  57  12-02    PT/INR - ( 01 Dec 2021 04:38 )   PT: 12.5 sec;   INR: 1.09 ratio       PTT - ( 01 Dec 2021 04:38 )  PTT: 30.2 sec

## 2021-12-03 NOTE — PROGRESS NOTE ADULT - PROVIDER SPECIALTY LIST ADULT
Surgery
Surgery
Anesthesia
Gastroenterology
Surgery
Gastroenterology
Internal Medicine
Surgery
Surgery
Internal Medicine

## 2021-12-03 NOTE — DISCHARGE NOTE NURSING/CASE MANAGEMENT/SOCIAL WORK - NSDCFUADDAPPT_GEN_ALL_CORE_FT
[facility] will reach out to you by email to set up an appointment. Should you not receive any communication, feel free to call them at [phone].     After initiation of cancer treatment, please make an appointment with Surgical Oncologist Dr. Davies for further consultation regarding surgery.

## 2021-12-03 NOTE — PROGRESS NOTE ADULT - SUBJECTIVE AND OBJECTIVE BOX
SURGICAL ONCOLOGY PROGRESS NOTE    No complaints    Vital Signs Last 24 Hrs  T(C): 36.7 (03 Dec 2021 04:52), Max: 37.1 (02 Dec 2021 21:06)  T(F): 98.1 (03 Dec 2021 04:52), Max: 98.7 (02 Dec 2021 21:06)  HR: 87 (03 Dec 2021 04:52) (87 - 88)  BP: 121/73 (03 Dec 2021 04:52) (100/72 - 123/71)  BP(mean): --  RR: 17 (03 Dec 2021 04:52) (16 - 17)  SpO2: 97% (03 Dec 2021 04:52) (97% - 98%)  I&O's Detail    02 Dec 2021 07:01  -  03 Dec 2021 07:00  --------------------------------------------------------  IN:    Oral Fluid: 350 mL  Total IN: 350 mL    OUT:    Voided (mL): 1275 mL  Total OUT: 1275 mL    Total NET: -925 mL          PE:    A&A  NAD    soft, NT, ND, No peritoneal signs                            14.3   8.61  )-----------( 256      ( 02 Dec 2021 06:04 )             43.6     12-02    139  |  104  |  9   ----------------------------<  111<H>  3.8   |  24  |  0.92    Ca    9.4      02 Dec 2021 06:04  Phos  3.8     12-02  Mg     2.10     12-02    TPro  7.1  /  Alb  3.8  /  TBili  0.3  /  DBili  <0.2  /  AST  24  /  ALT  26  /  AlkPhos  57  12-02      Final Diagnosis   1. Duodenal mass biopsy:   Moderately differentiated adenocarcinoma   Comment: Case subject to intradepartmental review.   Verified by: CAMILA CASTILLO   (Electronic Signature)   Reported on: 12/02/21 16:32 [facility], [address] [place] [address] [address] [facility], [place] [address]   Phone: [phone] Fax: [phone]   _________________________________________________________________   Clinical Information   Gallbladder mass   Gross Description   Received in formalin labeled "duodenal mass biopsy" are multiple, tan-   pink, soft tissue fragments ranging from 0.1 cm to 0.5 cm in greatest   dimension. Entirely in one cassette: 1A.   Luciano HUFF ([facility]) 12/01/2021 04:01 PM   Disclaimer   In addition to other data that may appear on the specimen containers, all   labels have been inspected to confirm the presence of the patient's name   and date of birth.   Histological Processing Performed at [facility],   Department of Pathology, [address] [place] [address].      SURGICAL ONCOLOGY PROGRESS NOTE    No complaints.  Seen this AM    Vital Signs Last 24 Hrs  T(C): 36.7 (03 Dec 2021 04:52), Max: 37.1 (02 Dec 2021 21:06)  T(F): 98.1 (03 Dec 2021 04:52), Max: 98.7 (02 Dec 2021 21:06)  HR: 87 (03 Dec 2021 04:52) (87 - 88)  BP: 121/73 (03 Dec 2021 04:52) (100/72 - 123/71)  BP(mean): --  RR: 17 (03 Dec 2021 04:52) (16 - 17)  SpO2: 97% (03 Dec 2021 04:52) (97% - 98%)  I&O's Detail    02 Dec 2021 07:01  -  03 Dec 2021 07:00  --------------------------------------------------------  IN:    Oral Fluid: 350 mL  Total IN: 350 mL    OUT:    Voided (mL): 1275 mL  Total OUT: 1275 mL    Total NET: -925 mL          PE:    A&A  NAD    soft, NT, ND, No peritoneal signs                            14.3   8.61  )-----------( 256      ( 02 Dec 2021 06:04 )             43.6     12-02    139  |  104  |  9   ----------------------------<  111<H>  3.8   |  24  |  0.92    Ca    9.4      02 Dec 2021 06:04  Phos  3.8     12-02  Mg     2.10     12-02    TPro  7.1  /  Alb  3.8  /  TBili  0.3  /  DBili  <0.2  /  AST  24  /  ALT  26  /  AlkPhos  57  12-02      Final Diagnosis   1. Duodenal mass biopsy:   Moderately differentiated adenocarcinoma   Comment: Case subject to intradepartmental review.   Verified by: CAMILA CASTILLO   (Electronic Signature)   Reported on: 12/02/21 16:32 [facility], [facility], [address] [place] [address]. [place] [address], [place] [address]   Phone: [phone] Fax: [phone]   _________________________________________________________________   Clinical Information   Gallbladder mass   Gross Description   Received in formalin labeled "duodenal mass biopsy" are multiple, tan-   pink, soft tissue fragments ranging from 0.1 cm to 0.5 cm in greatest   dimension. Entirely in one cassette: 1A.   Luciano HUFF ([facility]) 12/01/2021 04:01 PM   Disclaimer   In addition to other data that may appear on the specimen containers, all   labels have been inspected to confirm the presence of the patient's name   and date of birth.   Histological Processing Performed at [facility],   Department of Pathology, [address] [place] [address].

## 2021-12-03 NOTE — PROGRESS NOTE ADULT - PROBLEM SELECTOR PLAN 4
- DVT ppx: SCDs with planning for possible bx  - Diet:  Halal  - Dispo: Home with outpatient follow-up for further planning of management

## 2021-12-03 NOTE — DISCHARGE NOTE NURSING/CASE MANAGEMENT/SOCIAL WORK - PATIENT PORTAL LINK FT
You can access the FollowMyHealth Patient Portal offered by [facility] by registering at the following website: [email or web address]. By joining [facility]’s FollowMyHealth portal, you will also be able to view your health information using other applications (apps) compatible with our system.

## 2021-12-03 NOTE — CONSULT NOTE ADULT - ASSESSMENT
***Note not final until attending/fellow attestation*** ***Note not final until attending/fellow attestation***    Mr. Camacho is a 67 y/o man with significant PMHx of HTN and TIIDM who presents to the hospital in the setting of recurrent abdominal pain found to have GB mass on imaging with FNA positive for adenocarcinoma. Oncology consulted for further management.       #Gallbladder Adenocarcinoma:  -New diagnosis, no past oncologic history of note.   -Imaging and FNA positive for adenocarcinoma with 2 RP nodes on imaging. Mass indicates ~3.5 x 3.0 sized GB mass invading liver parenchyma/adjacent common hepatic duct and eroding into the duodenum causing mild to moderate stenosis. Patient currently asymptomatic. Pathology notified to run dMMR/MSI and Foundation testing on samples if possible.   -Staging CT C/A/P noted.   -Appreciate surg-onc evaluation.  -No further inpatient oncologic w/u at this time patient can f/u with Roberto SALGUERO as an outpatient, we have sent f/u email and [facility] will be reaching out to patient's son to set up follow up appointment. As an outpatient decision for neoadjuvant therapy +/- radiation therapy along w/surgical options can be discussed.  -Please have patient f/u with surg-onc upon discharge.       Rest of care per primary team.     Plan d/w Dr. White, Dr. Diaz, patient and family at the bedside.

## 2021-12-03 NOTE — DISCHARGE NOTE NURSING/CASE MANAGEMENT/SOCIAL WORK - NSDCPEFALRISK_GEN_ALL_CORE
For information on Fall & Injury Prevention, visit: [email or web address] OR  [email or web address] OR  https://www.cdc.gov/steadi/patient.html

## 2021-12-03 NOTE — CONSULT NOTE ADULT - SUBJECTIVE AND OBJECTIVE BOX
HEMATOLOGY ONCOLOGY CONSULT     Patient is a 66y old  Male who presents with a chief complaint of Abdominal pain (03 Dec 2021 07:25)      HPI:  65yo M with history of HTN and T2DM presents to hospital for evaluation of 5-6 month history of abdominal pain. Patient reports insidious onset of epigastric abdominal pain 5-6 months ago. At that time was treated with H2 blockers with some relief. Pain has worsened since then, 9/10. Exacerbated with movement and eating, improved when lying still. Associated with early satiety and unknown amount of weight loss. He was seen in [facility] recently for similar complaint. At that time underwent US, CT and MRI which revealed gallbladder mass, prescribed flagyl and he was told to come to a 'better hospital'. He reports subjective fevers, abdominal distension and night sweats.    ED Course:  Afebrile. , /83. 98% on RA  Given tylenol and famotidine x1. Underwent US and CT scan revealing GB mass with extension into liver parenchyma. (28 Nov 2021 21:01)       ROS:  Negative except for:    PAST MEDICAL & SURGICAL HISTORY:  HTN (hypertension)    DM (diabetes mellitus)    GERD (gastroesophageal reflux disease)    No significant past surgical history        SOCIAL HISTORY:    FAMILY HISTORY:  No pertinent family history in first degree relatives        MEDICATIONS  (STANDING):  amLODIPine   Tablet 5 milliGRAM(s) Oral daily  dextrose 40% Gel 15 Gram(s) Oral once  dextrose 5%. 1000 milliLiter(s) (50 mL/Hr) IV Continuous <Continuous>  dextrose 5%. 1000 milliLiter(s) (100 mL/Hr) IV Continuous <Continuous>  dextrose 50% Injectable 25 Gram(s) IV Push once  dextrose 50% Injectable 12.5 Gram(s) IV Push once  dextrose 50% Injectable 25 Gram(s) IV Push once  enoxaparin Injectable 40 milliGRAM(s) SubCutaneous daily  famotidine    Tablet 20 milliGRAM(s) Oral two times a day  glucagon  Injectable 1 milliGRAM(s) IntraMuscular once  insulin lispro (ADMELOG) corrective regimen sliding scale   SubCutaneous at bedtime  insulin lispro (ADMELOG) corrective regimen sliding scale   SubCutaneous three times a day before meals  lactated ringers. 1000 milliLiter(s) (50 mL/Hr) IV Continuous <Continuous>  pantoprazole    Tablet 40 milliGRAM(s) Oral before breakfast    MEDICATIONS  (PRN):  acetaminophen     Tablet .. 650 milliGRAM(s) Oral every 6 hours PRN Mild Pain (1 - 3)  aluminum hydroxide/magnesium hydroxide/simethicone Suspension 30 milliLiter(s) Oral every 4 hours PRN Dyspepsia  oxyCODONE    IR 2.5 milliGRAM(s) Oral every 6 hours PRN Moderate Pain (4 - 6)  oxyCODONE    IR 5 milliGRAM(s) Oral every 8 hours PRN Severe Pain (7 - 10)      Allergies    No Known Allergies    Intolerances        Vital Signs Last 24 Hrs  T(C): 36.7 (03 Dec 2021 04:52), Max: 37.1 (02 Dec 2021 21:06)  T(F): 98.1 (03 Dec 2021 04:52), Max: 98.7 (02 Dec 2021 21:06)  HR: 87 (03 Dec 2021 04:52) (87 - 88)  BP: 121/73 (03 Dec 2021 04:52) (100/72 - 123/71)  BP(mean): --  RR: 17 (03 Dec 2021 04:52) (16 - 17)  SpO2: 97% (03 Dec 2021 04:52) (97% - 98%)    PHYSICAL EXAM  General: adult in NAD  HEENT: clear oropharynx, anicteric sclera, pink conjunctiva  Neck: supple  CV: normal S1/S2 with no murmur rubs or gallops  Lungs: positive air movement b/l ant lungs,clear to auscultation, no wheezes, no rales  Abdomen: soft non-tender non-distended, no hepatosplenomegaly  Ext: no clubbing cyanosis or edema  Skin: no rashes and no petechiae  Neuro: alert and oriented X 4, no focal deficits      12-02-21 @ 07:01  -  12-03-21 @ 07:00  --------------------------------------------------------  IN: 350 mL / OUT: 1275 mL / NET: -925 mL      LABS:                          14.3   8.61  )-----------( 256      ( 02 Dec 2021 06:04 )             43.6         Mean Cell Volume : 94.0 fL  Mean Cell Hemoglobin : 30.8 pg  Mean Cell Hemoglobin Concentration : 32.8 gm/dL  Auto Neutrophil # : x  Auto Lymphocyte # : x  Auto Monocyte # : x  Auto Eosinophil # : x  Auto Basophil # : x  Auto Neutrophil % : x  Auto Lymphocyte % : x  Auto Monocyte % : x  Auto Eosinophil % : x  Auto Basophil % : x      12-02    139  |  104  |  9   ----------------------------<  111<H>  3.8   |  24  |  0.92    Ca    9.4      02 Dec 2021 06:04  Phos  3.8     12-02  Mg     2.10     12-02    TPro  7.1  /  Alb  3.8  /  TBili  0.3  /  DBili  <0.2  /  AST  24  /  ALT  26  /  AlkPhos  57  12-02                      BLOOD SMEAR INTERPRETATION:       RADIOLOGY & ADDITIONAL STUDIES:       HEMATOLOGY ONCOLOGY CONSULT     Patient is a 66y old  Male who presents with a chief complaint of Abdominal pain (03 Dec 2021 07:25)      HPI:  67yo M with history of HTN and T2DM presents to hospital for evaluation of 5-6 month history of abdominal pain. Patient reports insidious onset of epigastric abdominal pain 5-6 months ago. At that time was treated with H2 blockers with some relief. Pain has worsened since then, 9/10. Exacerbated with movement and eating, improved when lying still. Associated with early satiety and unknown amount of weight loss. He was seen in [facility] recently for similar complaint. At that time underwent US, CT and MRI which revealed gallbladder mass, prescribed flagyl and he was told to come to a 'better hospital'. He reports subjective fevers, abdominal distension and night sweats.    ED Course:  Afebrile. , /83. 98% on RA  Given tylenol and famotidine x1. Underwent US and CT scan revealing GB mass with extension into liver parenchyma. (28 Nov 2021 21:01)    Hospital Course:    Patient presented, imaging positive for gallbladder mass. S/p EGD/EUS with FNA positive for adenocarcinoma. Imaging with evidence of two RP nodes (sarah hepatis LN measuring 2.6 x 1.4cm). Patient this morning feels asymptomatic, no further abdominal pain, denies any n/v/d. Patient desires to go home.        ROS:  Negative except for:    PAST MEDICAL & SURGICAL HISTORY:  HTN (hypertension)    DM (diabetes mellitus)    GERD (gastroesophageal reflux disease)    No significant past surgical history        SOCIAL HISTORY: Denies smoking, denies drinking, denies illicit drug use. Used to work in the [employer] in [facility], [place] area.     FAMILY HISTORY:  No pertinent family history in first degree relatives        MEDICATIONS  (STANDING):  amLODIPine   Tablet 5 milliGRAM(s) Oral daily  dextrose 40% Gel 15 Gram(s) Oral once  dextrose 5%. 1000 milliLiter(s) (50 mL/Hr) IV Continuous <Continuous>  dextrose 5%. 1000 milliLiter(s) (100 mL/Hr) IV Continuous <Continuous>  dextrose 50% Injectable 25 Gram(s) IV Push once  dextrose 50% Injectable 12.5 Gram(s) IV Push once  dextrose 50% Injectable 25 Gram(s) IV Push once  enoxaparin Injectable 40 milliGRAM(s) SubCutaneous daily  famotidine    Tablet 20 milliGRAM(s) Oral two times a day  glucagon  Injectable 1 milliGRAM(s) IntraMuscular once  insulin lispro (ADMELOG) corrective regimen sliding scale   SubCutaneous at bedtime  insulin lispro (ADMELOG) corrective regimen sliding scale   SubCutaneous three times a day before meals  lactated ringers. 1000 milliLiter(s) (50 mL/Hr) IV Continuous <Continuous>  pantoprazole    Tablet 40 milliGRAM(s) Oral before breakfast    MEDICATIONS  (PRN):  acetaminophen     Tablet .. 650 milliGRAM(s) Oral every 6 hours PRN Mild Pain (1 - 3)  aluminum hydroxide/magnesium hydroxide/simethicone Suspension 30 milliLiter(s) Oral every 4 hours PRN Dyspepsia  oxyCODONE    IR 2.5 milliGRAM(s) Oral every 6 hours PRN Moderate Pain (4 - 6)  oxyCODONE    IR 5 milliGRAM(s) Oral every 8 hours PRN Severe Pain (7 - 10)      Allergies    No Known Allergies    Intolerances        Vital Signs Last 24 Hrs  T(C): 36.7 (03 Dec 2021 04:52), Max: 37.1 (02 Dec 2021 21:06)  T(F): 98.1 (03 Dec 2021 04:52), Max: 98.7 (02 Dec 2021 21:06)  HR: 87 (03 Dec 2021 04:52) (87 - 88)  BP: 121/73 (03 Dec 2021 04:52) (100/72 - 123/71)  BP(mean): --  RR: 17 (03 Dec 2021 04:52) (16 - 17)  SpO2: 97% (03 Dec 2021 04:52) (97% - 98%)    PHYSICAL EXAM  General: NAD  HEENT: anicteric sclera.   Neck: supple  CV: normal S1/S2 with no murmur rubs or gallops  Lungs: CTAB  Abdomen: soft non-tender non-distended, no hepatosplenomegaly  Ext: no clubbing cyanosis or edema  Skin: no rashes and no petechiae  Neuro: alert and oriented X 4, no focal deficits      12-02-21 @ 07:01  -  12-03-21 @ 07:00  --------------------------------------------------------  IN: 350 mL / OUT: 1275 mL / NET: -925 mL      LABS:                          14.3   8.61  )-----------( 256      ( 02 Dec 2021 06:04 )             43.6         Mean Cell Volume : 94.0 fL  Mean Cell Hemoglobin : 30.8 pg  Mean Cell Hemoglobin Concentration : 32.8 gm/dL  Auto Neutrophil # : x  Auto Lymphocyte # : x  Auto Monocyte # : x  Auto Eosinophil # : x  Auto Basophil # : x  Auto Neutrophil % : x  Auto Lymphocyte % : x  Auto Monocyte % : x  Auto Eosinophil % : x  Auto Basophil % : x      12-02    139  |  104  |  9   ----------------------------<  111<H>  3.8   |  24  |  0.92    Ca    9.4      02 Dec 2021 06:04  Phos  3.8     12-02  Mg     2.10     12-02    TPro  7.1  /  Alb  3.8  /  TBili  0.3  /  DBili  <0.2  /  AST  24  /  ALT  26  /  AlkPhos  57  12-02                      BLOOD SMEAR INTERPRETATION:       RADIOLOGY & ADDITIONAL STUDIES:    < from: MR Abdomen w/wo IV Cont (11.30.21 @ 18:58) >  IMPRESSION:  4 cm mass in the gallbladder fossa suspicious for primary gallbladder malignancy. Evidence of local invasion of the adjacent liver.    Sarah hepatis lymphadenopathy.    < end of copied text >  < from: CT Abdomen and Pelvis w/ IV Cont (11.28.21 @ 17:45) >  3.5 x 3.0 cm low-attenuation mass replacing the gallbladder and invading the adjacent liver parenchyma. There is also involvement of the adjacent common hepatic duct resulting in mild intrahepatic dilatation. There is also very close abutment with the duodenum without definite fistulization.    Portal lymphadenopathy.    A 2.7 cm indeterminate hypodensity in the left kidney.    < end of copied text >

## 2021-12-03 NOTE — PROGRESS NOTE ADULT - REASON FOR ADMISSION
Abdominal pain
URINE SAMPLE BROUGHT TO THE STAT LAB.
Abdominal pain

## 2021-12-03 NOTE — CONSULT NOTE ADULT - ATTENDING COMMENTS
66M w/ hx of HTN, DM, GERD, presenting with abdominal pain for 5-6 months and imaging with 3.5cm gallbladder mass.    - Recommend MRCP for better evaluation of anatomy and involved structures  - Recommend chest CT for staging work up  - Please send tumor markers (CEA, CA 19-9)  - GI consult for ERCP/EUS/biopsy
Pt seen examined and d/w med onc resident and fellow. Pt with several mo h/o epigastric abd pain, progressive with early satiety and unspecified wt loss.  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 Pt w/o other complaints. PE Sclerae anicteric, Lungs clear  Heart RRR S1S2 Abd NABS soft / NT, no HSM / masses. A/P Agree with A/P as above. Pt with cholangioca and 2 enlarged melisa hep LNs radiologically. Otilio crawford proceeding with neoadjuvant chemo then pot surgery per surg onc team recommendations. This was d/w pt /family. No inpt chemo planned - will coordinate  further eval an dtreatmnet at [facility] upon discharge

## 2021-12-03 NOTE — PROGRESS NOTE ADULT - ATTENDING COMMENTS
GB mass likely locally advanced GB cancer    Would f/u path    Would recc Med Onc eval as well.  Would consider a neoadjuvant approach.
As above.    Pt seen/examined in the early evening of 11/30/21.    Impression:    #1.  Gallbladder mass invading liver, concerning for malignancy  #2.  Periportal lymphadenopathy, appears amenable for EUS/FNB  #3.  LUQ abd pain not due to #1 or #2.  Differential diagnosis includes peptic ulcer disease, gastritis, constipation    Recommendations:    #1.  MRI/MRCP with and without contrast to better characterize gallbladder and liver  #2.  Eventual EUS/FNB, likely Thursday  #3.  Empiric PPI
Post EGD with biopsy. Awaiting pathology.
Assessment:   66M w/ hx of HTN, DM, GERD, presenting with abdominal pain for 5-6 months and imaging with 3.5cm gallbladder mass.    - MRCP to better evaluate anatomy and involved structures  - CT chest noted  - Tumor markers noted  - F/u EUS/biopsy
GB mass with hepatic invasion, obtain MRCP, GI consult for possible EUS w/ FNA  Neoplasm related pain, pain control w/ Oxycodone prn   HTN, c/w Norvasc, monitor BP
Gallbladder mass with hepatic invasion, MRCP showed mass in gallbladder fossa w/ local invasion of liver, s/p EUS w/ FNB 12/1, f/up path, outpatient f/up with GI/Onc   Neoplasm related pain, pain control w/ Oxycodone prn   HTN, c/w Norvasc, monitor BP  DC home, d/c time 32 minutes
Gallbladder mass with hepatic invasion, obtain MRCP, plan for EUS w/ FNB likely Wednesday as per GI   Neoplasm related pain, pain control w/ Oxycodone prn   HTN, c/w Norvasc, monitor BP
Gallbladder mass with hepatic invasion, MRCP showed mass in gallbladder fossa w/ local invasion of liver, plan for EUS w/ FNB today   Neoplasm related pain, pain control w/ Oxycodone prn   HTN, c/w Norvasc, monitor BP
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

## 2021-12-03 NOTE — PROGRESS NOTE ADULT - ASSESSMENT
Gall bladder adenoca    Path noted    Will need f/u w Med Onc and me in office.  Would consider neoadj chemo and poss resection pending dz response. Gall bladder adenoca    Path noted    Will need f/u w Med Onc and me in office.  Would consider neoadj chemo and poss resection pending dz response.    Spoke w Daughter yesterday afternoon via telephone at pt bedside

## 2021-12-06 NOTE — CHART NOTE - NSCHARTNOTEFT_GEN_A_CORE
ONCOLOGY FELLOW NOTE    MSI/MMR testing requested from Pathology on 12/3/21.    Foundation test unable to be ordered at this time. Given patient's insurance coverage, he requires an ABN form to be filled out. Patient was discharged prior to filling out this form. Foundation testing will be completed outpatient.     Khadra Diaz MD  Hematology/Oncology Fellow, PGY-5  Pager: [phone]  After 5pm and on weekends please page on-call fellow

## 2021-12-15 PROBLEM — Z00.00 ENCOUNTER FOR PREVENTIVE HEALTH EXAMINATION: Status: ACTIVE | Noted: 2021-12-15

## 2024-08-16 NOTE — ASU PREOP CHECKLIST - SITE MARKED BY ANESTHESIOLOGIST
Received result note for potassium labs.         Patient notified via portal (through result note). Will monitor for patient to read portal message.      n/a